# Patient Record
Sex: FEMALE | Race: WHITE | NOT HISPANIC OR LATINO | URBAN - METROPOLITAN AREA
[De-identification: names, ages, dates, MRNs, and addresses within clinical notes are randomized per-mention and may not be internally consistent; named-entity substitution may affect disease eponyms.]

---

## 2023-06-07 DIAGNOSIS — I10 HYPERTENSION, UNSPECIFIED TYPE: ICD-10-CM

## 2023-06-08 RX ORDER — METOPROLOL TARTRATE 50 MG/1
TABLET ORAL
Qty: 90 TABLET | Refills: 3 | Status: SHIPPED | OUTPATIENT
Start: 2023-06-08 | End: 2023-06-12

## 2023-06-09 DIAGNOSIS — I10 HYPERTENSION, UNSPECIFIED TYPE: ICD-10-CM

## 2023-06-12 LAB
NON-UH HIE A/G RATIO: 1.2
NON-UH HIE ALB: 3.7 G/DL (ref 3.4–5)
NON-UH HIE ALK PHOS: 48 UNIT/L (ref 46–116)
NON-UH HIE APPEARANCE, U: CLEAR
NON-UH HIE BASO COUNT: 0.06 X1000 (ref 0–0.2)
NON-UH HIE BASOS %: 0.9 %
NON-UH HIE BILIRUBIN, TOTAL: 0.6 MG/DL (ref 0.2–1)
NON-UH HIE BILIRUBIN, U: NEGATIVE
NON-UH HIE BLOOD, U: NEGATIVE
NON-UH HIE BUN/CREAT RATIO: 18.8
NON-UH HIE BUN: 15 MG/DL (ref 9–23)
NON-UH HIE CALCIUM: 9.3 MG/DL (ref 8.7–10.4)
NON-UH HIE CALCULATED LDL CHOLESTEROL: 73 MG/DL (ref 60–130)
NON-UH HIE CALCULATED OSMOLALITY: 284 MOSM/KG (ref 275–295)
NON-UH HIE CHLORIDE: 105 MMOL/L (ref 98–107)
NON-UH HIE CHOLESTEROL: 134 MG/DL (ref 100–200)
NON-UH HIE CO2, VENOUS: 30 MMOL/L (ref 20–31)
NON-UH HIE COLOR, U: NORMAL
NON-UH HIE CREATININE, URINE MG/DL: 45.7 MG/DL
NON-UH HIE CREATININE: 0.8 MG/DL (ref 0.5–0.8)
NON-UH HIE DIFF?: NO
NON-UH HIE EOS COUNT: 0.07 X1000 (ref 0–0.5)
NON-UH HIE EOSIN %: 1 %
NON-UH HIE GFR AA: >60
NON-UH HIE GLOBULIN: 3.1 G/DL
NON-UH HIE GLOMERULAR FILTRATION RATE: >60 ML/MIN/1.73M?
NON-UH HIE GLUCOSE QUAL, U: NEGATIVE
NON-UH HIE GLUCOSE: 60 MG/DL (ref 74–106)
NON-UH HIE GOT: 12 UNIT/L (ref 15–37)
NON-UH HIE GPT: 14 UNIT/L (ref 10–49)
NON-UH HIE HCT: 41.2 % (ref 36–46)
NON-UH HIE HDL CHOLESTEROL: 41 MG/DL (ref 40–60)
NON-UH HIE HGB A1C: 6.8 %
NON-UH HIE HGB: 13.7 G/DL (ref 12–16)
NON-UH HIE INSTR WBC: 6.7
NON-UH HIE IRON: 37 UG/DL (ref 40–170)
NON-UH HIE K: 4.2 MMOL/L (ref 3.5–5.1)
NON-UH HIE KETONES, U: NEGATIVE
NON-UH HIE LEUKOCYTE ESTERASE, U: NEGATIVE
NON-UH HIE LYMPH %: 27.5 %
NON-UH HIE LYMPH COUNT: 1.84 X1000 (ref 1.2–4.8)
NON-UH HIE MCH: 27.5 PG (ref 27–34)
NON-UH HIE MCHC: 33.1 G/DL (ref 32–37)
NON-UH HIE MCV: 83.1 FL (ref 80–100)
NON-UH HIE MICROALBUMIN, URINE MG/L: <5 MG/L
NON-UH HIE MICROALBUMIN/CREATININE RATIO: 7 MG MALB/GM CREAT (ref 0–30)
NON-UH HIE MONO %: 6.7 %
NON-UH HIE MONO COUNT: 0.45 X1000 (ref 0.1–1)
NON-UH HIE MPV: 7.9 FL (ref 7.4–10.4)
NON-UH HIE NA: 143 MMOL/L (ref 135–145)
NON-UH HIE NEUTROPHIL %: 63.9 %
NON-UH HIE NEUTROPHIL COUNT (ANC): 4.29 X1000 (ref 1.4–8.8)
NON-UH HIE NITRITE, U: NEGATIVE
NON-UH HIE NUCLEATED RBC: 0 /100WBC
NON-UH HIE PH, U: 5 (ref 4.5–8)
NON-UH HIE PLATELET: 295 X10 (ref 150–450)
NON-UH HIE PROTEIN, U: NEGATIVE
NON-UH HIE RBC: 4.96 X10 (ref 4.2–5.4)
NON-UH HIE RDW: 13.8 % (ref 11.5–14.5)
NON-UH HIE SPECIFIC GRAVITY, U: 1.01 (ref 1–1.03)
NON-UH HIE TOTAL CHOL/HDL CHOL RATIO: 3.3
NON-UH HIE TOTAL PROTEIN: 6.8 G/DL (ref 5.7–8.2)
NON-UH HIE TRIGLYCERIDES: 99 MG/DL (ref 30–150)
NON-UH HIE TSH: 1.58 UIU/ML (ref 0.55–4.78)
NON-UH HIE U MICRO: NORMAL
NON-UH HIE UROBILINOGEN QUAL, U: NORMAL
NON-UH HIE VIT D 25: 35 NG/ML
NON-UH HIE VITAMIN B12: 263 PG/ML (ref 211–911)
NON-UH HIE WBC: 6.7 X10 (ref 4.5–11)

## 2023-06-12 RX ORDER — METOPROLOL TARTRATE 50 MG/1
TABLET ORAL
Qty: 90 TABLET | Refills: 3 | Status: SHIPPED | OUTPATIENT
Start: 2023-06-12 | End: 2023-06-15 | Stop reason: SDUPTHER

## 2023-06-12 NOTE — TELEPHONE ENCOUNTER
----- Message from Tosha Lamb MD sent at 6/12/2023  1:55 PM EDT -----  Hypoglycemia low iron level advised complex carbohydrate diet plus exercise Slow Fe 1 a day follow-up 3 months

## 2023-06-15 ENCOUNTER — TELEPHONE (OUTPATIENT)
Dept: PRIMARY CARE | Facility: CLINIC | Age: 68
End: 2023-06-15

## 2023-06-15 ENCOUNTER — OFFICE VISIT (OUTPATIENT)
Dept: PRIMARY CARE | Facility: CLINIC | Age: 68
End: 2023-06-15
Payer: MEDICARE

## 2023-06-15 VITALS
HEART RATE: 61 BPM | TEMPERATURE: 97.6 F | SYSTOLIC BLOOD PRESSURE: 147 MMHG | BODY MASS INDEX: 31.64 KG/M2 | OXYGEN SATURATION: 97 % | WEIGHT: 221 LBS | HEIGHT: 70 IN | DIASTOLIC BLOOD PRESSURE: 87 MMHG

## 2023-06-15 DIAGNOSIS — I10 HYPERTENSION, UNSPECIFIED TYPE: ICD-10-CM

## 2023-06-15 DIAGNOSIS — E78.5 HYPERLIPIDEMIA, UNSPECIFIED HYPERLIPIDEMIA TYPE: ICD-10-CM

## 2023-06-15 DIAGNOSIS — I73.9 PERIPHERAL ARTERIAL DISEASE (CMS-HCC): ICD-10-CM

## 2023-06-15 DIAGNOSIS — I15.2 HYPERTENSION ASSOCIATED WITH DIABETES (MULTI): Primary | ICD-10-CM

## 2023-06-15 DIAGNOSIS — E11.59 HYPERTENSION ASSOCIATED WITH DIABETES (MULTI): Primary | ICD-10-CM

## 2023-06-15 DIAGNOSIS — Z12.12 SCREENING FOR RECTAL CANCER: ICD-10-CM

## 2023-06-15 DIAGNOSIS — I63.9 CEREBROVASCULAR ACCIDENT (CVA), UNSPECIFIED MECHANISM (MULTI): ICD-10-CM

## 2023-06-15 DIAGNOSIS — Z12.11 SCREENING FOR COLON CANCER: ICD-10-CM

## 2023-06-15 DIAGNOSIS — N18.30 STAGE 3 CHRONIC KIDNEY DISEASE, UNSPECIFIED WHETHER STAGE 3A OR 3B CKD (MULTI): ICD-10-CM

## 2023-06-15 DIAGNOSIS — I48.20 CHRONIC ATRIAL FIBRILLATION (MULTI): ICD-10-CM

## 2023-06-15 DIAGNOSIS — Z12.11 COLON CANCER SCREENING: ICD-10-CM

## 2023-06-15 PROCEDURE — 1159F MED LIST DOCD IN RCRD: CPT | Performed by: INTERNAL MEDICINE

## 2023-06-15 PROCEDURE — 3079F DIAST BP 80-89 MM HG: CPT | Performed by: INTERNAL MEDICINE

## 2023-06-15 PROCEDURE — 3077F SYST BP >= 140 MM HG: CPT | Performed by: INTERNAL MEDICINE

## 2023-06-15 PROCEDURE — 1036F TOBACCO NON-USER: CPT | Performed by: INTERNAL MEDICINE

## 2023-06-15 PROCEDURE — 99214 OFFICE O/P EST MOD 30 MIN: CPT | Performed by: INTERNAL MEDICINE

## 2023-06-15 PROCEDURE — 4010F ACE/ARB THERAPY RXD/TAKEN: CPT | Performed by: INTERNAL MEDICINE

## 2023-06-15 PROCEDURE — 1160F RVW MEDS BY RX/DR IN RCRD: CPT | Performed by: INTERNAL MEDICINE

## 2023-06-15 RX ORDER — PEN NEEDLE, DIABETIC 31 GX5/16"
NEEDLE, DISPOSABLE MISCELLANEOUS
COMMUNITY
Start: 2022-07-20 | End: 2024-02-26 | Stop reason: SDUPTHER

## 2023-06-15 RX ORDER — APIXABAN 5 MG/1
1 TABLET, FILM COATED ORAL 2 TIMES DAILY
COMMUNITY
Start: 2021-08-03 | End: 2023-06-15 | Stop reason: SDUPTHER

## 2023-06-15 RX ORDER — AMLODIPINE BESYLATE 5 MG/1
5 TABLET ORAL DAILY
Qty: 90 TABLET | Refills: 3 | Status: SHIPPED | OUTPATIENT
Start: 2023-06-15 | End: 2023-06-19

## 2023-06-15 RX ORDER — FUROSEMIDE 20 MG/1
TABLET ORAL
COMMUNITY
Start: 2021-03-15 | End: 2023-06-15 | Stop reason: SDUPTHER

## 2023-06-15 RX ORDER — METFORMIN HYDROCHLORIDE 500 MG/1
TABLET ORAL
COMMUNITY
Start: 2018-02-22 | End: 2023-06-19

## 2023-06-15 RX ORDER — ROSUVASTATIN CALCIUM 10 MG/1
10 TABLET, COATED ORAL DAILY
Qty: 90 TABLET | Refills: 3 | Status: SHIPPED | OUTPATIENT
Start: 2023-06-15 | End: 2023-06-19

## 2023-06-15 RX ORDER — ROSUVASTATIN CALCIUM 10 MG/1
TABLET, COATED ORAL
COMMUNITY
Start: 2014-04-28 | End: 2023-06-15 | Stop reason: SDUPTHER

## 2023-06-15 RX ORDER — FUROSEMIDE 20 MG/1
20 TABLET ORAL DAILY
Qty: 90 TABLET | Refills: 3 | Status: SHIPPED | OUTPATIENT
Start: 2023-06-15 | End: 2023-06-19

## 2023-06-15 RX ORDER — FLECAINIDE ACETATE 100 MG/1
1 TABLET ORAL EVERY 12 HOURS
COMMUNITY
Start: 2021-03-25 | End: 2023-06-15 | Stop reason: SDUPTHER

## 2023-06-15 RX ORDER — POTASSIUM CHLORIDE 20 MEQ/1
20 TABLET, EXTENDED RELEASE ORAL DAILY
Qty: 90 TABLET | Refills: 3 | Status: SHIPPED | OUTPATIENT
Start: 2023-06-15 | End: 2023-06-19

## 2023-06-15 RX ORDER — METOPROLOL TARTRATE 50 MG/1
50 TABLET ORAL DAILY
Qty: 90 TABLET | Refills: 3 | Status: SHIPPED | OUTPATIENT
Start: 2023-06-15 | End: 2024-05-09

## 2023-06-15 RX ORDER — CALCIUM CARBONATE/VITAMIN D3 600MG-5MCG
TABLET ORAL
COMMUNITY
Start: 2014-09-22

## 2023-06-15 RX ORDER — FLECAINIDE ACETATE 100 MG/1
100 TABLET ORAL EVERY 12 HOURS
Qty: 180 TABLET | Refills: 3 | Status: SHIPPED | OUTPATIENT
Start: 2023-06-15 | End: 2023-06-19

## 2023-06-15 RX ORDER — LISINOPRIL 20 MG/1
TABLET ORAL
COMMUNITY
Start: 2021-03-15 | End: 2023-10-12 | Stop reason: SDUPTHER

## 2023-06-15 RX ORDER — AMLODIPINE BESYLATE 5 MG/1
TABLET ORAL
COMMUNITY
Start: 2013-10-22 | End: 2023-06-15 | Stop reason: SDUPTHER

## 2023-06-15 RX ORDER — ASCORBIC ACID 500 MG
TABLET ORAL
COMMUNITY
Start: 2022-10-20 | End: 2023-10-12

## 2023-06-15 RX ORDER — INSULIN DEGLUDEC 200 U/ML
INJECTION, SOLUTION SUBCUTANEOUS
COMMUNITY
Start: 2016-12-15 | End: 2023-10-12 | Stop reason: SDUPTHER

## 2023-06-15 RX ORDER — POTASSIUM CHLORIDE 1500 MG/1
TABLET, EXTENDED RELEASE ORAL
COMMUNITY
Start: 2021-03-11 | End: 2023-06-15 | Stop reason: SDUPTHER

## 2023-06-15 NOTE — PROGRESS NOTES
"Subjective   Patient ID: Viki Boykin is a 67 y.o. female who presents for Follow-up (Go over blood work ).    Assessment/Plan     Problem List Items Addressed This Visit          Circulatory    Hypertension associated with diabetes (CMS/HCC) - Primary    Peripheral arterial disease (CMS/HCC)    Chronic atrial fibrillation (CMS/HCC)    Relevant Medications    amLODIPine (Norvasc) 5 mg tablet       Genitourinary    Stage 3 chronic kidney disease, unspecified whether stage 3a or 3b CKD (CMS/HCC)     Other Visit Diagnoses       Cerebrovascular accident (CVA), unspecified mechanism (CMS/HCC)        Relevant Orders    Disability Placard        Patient was evaluated today, problem list was reviewed, problems and concerns addressed, Rx list reviewed and updated, lab and tests were noted and reviewed. Life style changes were discussed, always it works better if we eat plant based diet and plenty of fibres and roughage. Consume adequate amount of water and avoid alcohol, light to moderate physical activities and stress reduction are always beneficial for ongoing physical well being. Do not forget to have 6 to 7 hours of sleep regularly and avoid late night artur screen exposure.      HPI 67-year-old patient have a stroke with a left hemiplegia chronic hypertension hyperlipidemia anemia obesity diabetes with complication atrial fibrillation complaining arthralgia myalgia fatigue tired weakness    Negative for headache chest pain fall    Negative for hypoxia hypoglycemia    Negative for suicide        Allergies   Allergen Reactions    Cat Dander Swelling    Penicillins Other     PCN, did not work on patient.    Tree And Shrub Pollen Hives       Current Outpatient Medications   Medication Sig Dispense Refill    amLODIPine (Norvasc) 5 mg tablet Take by mouth.      ascorbic acid (Vitamin C) 500 mg tablet Take by mouth.      BD Ultra-Fine Dulce Pen Needle 32 gauge x 5/32\" needle INJECTS ONCE A DAY      calcium carbonate-vitamin " "D3 600 mg-5 mcg (200 unit) tablet Take by mouth once daily.      Eliquis 5 mg tablet Take 1 tablet (5 mg) by mouth 2 times a day.      flecainide (Tambocor) 100 mg tablet Take 1 tablet (100 mg) by mouth every 12 hours.      furosemide (Lasix) 20 mg tablet Take by mouth.      insulin degludec (Tresiba FlexTouch) 200 unit/mL (3 mL) injection Inject under the skin.      Klor-Con M20 20 mEq ER tablet Take by mouth.      lisinopril 20 mg tablet Take by mouth once daily.      metFORMIN (Glucophage) 500 mg tablet Take by mouth.      metoprolol tartrate (Lopressor) 50 mg tablet TAKE 1 TABLET BY MOUTH DAILY 90 tablet 3    rosuvastatin (Crestor) 10 mg tablet Take by mouth.      zinc acetate 50 mg (zinc) capsule 50 mg.       No current facility-administered medications for this visit.       Objective   Visit Vitals  /87 (BP Location: Left arm, Patient Position: Sitting, BP Cuff Size: Large adult)   Pulse 61   Temp 36.4 °C (97.6 °F)   Ht 1.778 m (5' 10\")   Wt 100 kg (221 lb)   SpO2 97%   BMI 31.71 kg/m²   Smoking Status Never   BSA 2.22 m²     Physical Exam  Cardiovascular:      Pulses:           Dorsalis pedis pulses are 2+ on the right side and 2+ on the left side.        Posterior tibial pulses are 2+ on the right side and 2+ on the left side.   Musculoskeletal:      Right foot: No deformity.      Left foot: No deformity.   Feet:      Right foot:      Protective Sensation: 5 sites tested.        Skin integrity: Skin integrity normal.      Toenail Condition: Right toenails are normal.      Left foot:      Protective Sensation: 5 sites tested.        Skin integrity: Skin integrity normal.      Toenail Condition: Left toenails are normal.       Constitutional:       General: He is not in acute distress.     Appearance: Normal appearance.  Obesity  HENT:      Head: Normocephalic and atraumatic.      Nose: Nose normal.   Eyes:      Extraocular Movements: Extraocular movements intact.      Conjunctiva/sclera: Conjunctivae " normal.   Cardiovascular:      Rate and Rhythm: Normal rate and regular rhythm.  Irregular  Pulmonary:      Effort: Pulmonary effort is normal.      Breath sounds: Normal breath sounds.   Skin:     General: Skin is warm.   Neurological: Diabetic neuropathy     Mental Status: He is alert and oriented to person, place, and time.  Left hemiplegia  Psychiatric:         Mood and Affect: Mood normal.         Behavior: Behavior normal.   Immunization History   Administered Date(s) Administered    Influenza, Unspecified 11/16/2009, 10/12/2010, 11/12/2012, 08/29/2013, 09/22/2014, 12/01/2015, 09/12/2016, 09/07/2018    Influenza, seasonal, injectable 10/13/2020    Pfizer Purple Cap SARS-CoV-2 03/30/2021, 04/20/2021    Pneumococcal Conjugate Pcv 20 02/13/2023    Pneumococcal Polysaccharide PPSV23 06/19/2020    SARS-CoV-2, Unspecified 04/30/2022    TD (adult), 2 Lf tetanus toxoid, preservative free, adsorbed 11/20/2007    Tdap 05/23/2021    Tetanus toxoid, adsorbed 11/20/2007       Review of Systems    Orders Only on 06/12/2023   Component Date Value Ref Range Status    [facility] RDW 06/12/2023 13.8  11.5 - 14.5 % Final    [facility] WBC 06/12/2023 6.7  4.5 - 11.0 x10 Final    [facility] MCH 06/12/2023 27.5  27.0 - 34.0 pg Final    [facility] Nucleated RBC 06/12/2023 0  /100WBC Final    [facility] HCT 06/12/2023 41.2  36.0 - 46.0 % Final    [facility] Platelet 06/12/2023 295  150 - 450 x10 Final    [facility] RBC 06/12/2023 4.96  4.20 - 5.40 x10 Final    [facility] Instr WBC 06/12/2023 6.7   Final    [facility] MCHC 06/12/2023 33.1  32.0 - 37.0 g/dL Final    [facility] MCV 06/12/2023 83.1  80.0 - 100.0 fL Final    [facility] HGB 06/12/2023 13.7  12.0 - 16.0 g/dL Final    [facility] MPV 06/12/2023 7.9  7.4 - 10.4 fL Final    [facility] DIFF? 06/12/2023 No   Final    [facility] Mono Count 06/12/2023 0.45  0.10 - 1.00 x1000 Final    [facility] Neutrophil Count (ANC) 06/12/2023 4.29  1.40 - 8.80 x1000 Final    [facility] Neutrophil  % 06/12/2023 63.9  % Final    [facility] Eos Count 06/12/2023 0.07  0.00 - 0.50 x1000 Final    [facility] Eosin % 06/12/2023 1.0  % Final    [facility] Lymph % 06/12/2023 27.5  % Final    [facility] Lymph Count 06/12/2023 1.84  1.20 - 4.80 x1000 Final    [facility] Basos % 06/12/2023 0.9  % Final    [facility] Baso Count 06/12/2023 0.06  0.00 - 0.20 x1000 Final    [facility] Mono % 06/12/2023 6.7  % Final    [facility] U MICRO 06/12/2023 Not Indicated   Final    [facility] Nitrite, U 06/12/2023 Negative  Negative Final    [facility] Ketones, U 06/12/2023 Negative  Negative Final    [facility] pH, U 06/12/2023 5.0  4.5 - 8.0 Final    [facility] Glucose Qual, U 06/12/2023 Negative  Negative Final    [facility] Protein, U 06/12/2023 Negative  Negative Final    [facility] Color, U 06/12/2023 Straw   Final    [facility] Leukocyte Esterase, U 06/12/2023 Negative  Negative Final    [facility] Specific Gravity, U 06/12/2023 1.008  1.001 - 1.035 Final    [facility] Urobilinogen Qual, U 06/12/2023 <2.0 mg/dl  <2.0 mg/dl Final    [facility] Bilirubin, U 06/12/2023 Negative  Negative Final    [facility] Blood, U 06/12/2023 Negative  Negative Final    [facility] Appearance, U 06/12/2023 Clear   Final    [facility] IRON 06/12/2023 37 (L)  40 - 170 ug/dl Final    [facility] HGB A1C 06/12/2023 6.8  % Final    [facility] GPT 06/12/2023 14  10 - 49 unit/L Final    [facility] Alk Phos 06/12/2023 48  46 - 116 unit/L Final    [facility] Creatinine 06/12/2023 0.8  0.5 - 0.8 mg/dL Final    [facility] Total Protein 06/12/2023 6.8  5.7 - 8.2 g/dL Final    [facility] CO2, venous 06/12/2023 30.0  20.0 - 31.0 mmol/L Final    [facility] Glomerular Filtration R* 06/12/2023 >60  mL/min/1.73m? Final    [facility] Calculated Osmolality 06/12/2023 284  275 - 295 mOsm/kg Final    [facility] K 06/12/2023 4.2  3.5 - 5.1 mmol/L Final    [facility] Globulin 06/12/2023 3.1  g/dL Final    [facility] BUN 06/12/2023 15  9 - 23 mg/dL Final    [facility]  Calcium 06/12/2023 9.3  8.7 - 10.4 mg/dL Final    [facility] GOT 06/12/2023 12 (L)  15 - 37 unit/L Final    [facility] Glucose 06/12/2023 60 (L)  74 - 106 mg/dL Final    [facility] ALB 06/12/2023 3.7  3.4 - 5.0 g/dL Final    [facility] GFR AA 06/12/2023 >60   Final    [facility] Bilirubin, Total 06/12/2023 0.60  0.20 - 1.00 mg/dL Final    [facility] Chloride 06/12/2023 105  98 - 107 mmol/L Final    [facility] A/G Ratio 06/12/2023 1.2   Final    [facility] Na 06/12/2023 143  135 - 145 mmol/L Final    [facility] BUN/Creat Ratio 06/12/2023 18.8   Final    [facility] TSH 06/12/2023 1.58  0.55 - 4.78 uIU/ml Final    [facility] Microalbumin/Creatinine* 06/12/2023 7  0 - 30 mg MALB/gm CREAT Final    [facility] Creatinine, Urine mg/dl 06/12/2023 45.7  mg/dL Final    [facility] Microalbumin, Urine mg/L 06/12/2023 <5.0  mg/L Final    [facility] Cholesterol 06/12/2023 134  100 - 200 mg/dL Final    [facility] Calculated LDL Choleste* 06/12/2023 73  60 - 130 mg/dL Final    [facility] HDL Cholesterol 06/12/2023 41  40 - 60 mg/dL Final    [facility] Total Chol/HDL Chol Rat* 06/12/2023 3.3   Final    [facility] Triglycerides 06/12/2023 99  30 - 150 mg/dL Final    [facility] Vitamin B12 06/12/2023 263  211 - 911 pg/mL Final    [facility] Vit D 25 06/12/2023 35  ng/mL Final       Radiology: Reviewed imaging in powerchart.  No results found.    Family History   Problem Relation Name Age of Onset    Hypertension Mother      Diabetes Mother      Other (fibroid tumors) Mother      Diabetes Father      Heart disease Father       Social History     Socioeconomic History    Marital status:      Spouse name: None    Number of children: None    Years of education: None    Highest education level: None   Occupational History    None   Tobacco Use    Smoking status: Never    Smokeless tobacco: Never   Substance and Sexual Activity    Alcohol use: Yes     Comment: rare    Drug use: Never    Sexual activity: None   Other Topics Concern     None   Social History Narrative    None     Social Determinants of Health     Financial Resource Strain: Not on file   Food Insecurity: Not on file   Transportation Needs: Not on file   Physical Activity: Not on file   Stress: Not on file   Social Connections: Not on file   Intimate Partner Violence: Not on file   Housing Stability: Not on file     Past Medical History:   Diagnosis Date    Abscess of vulva 06/22/2020    Boil, labium    Body mass index (BMI) 31.0-31.9, adult 06/30/2022    BMI 31.0-31.9,adult    Candidiasis of skin and nail 12/05/2016    Skin yeast infection    Candidiasis of skin and nail     Cutaneous candidiasis    Candidiasis, unspecified 06/19/2020    Yeast infection    Contact with and (suspected) exposure to covid-19 02/08/2021    Suspected COVID-19 virus infection    Encounter for screening for malignant neoplasm of colon 06/27/2022    Screen for colon cancer    Encounter for screening for malignant neoplasm of rectum 06/27/2022    Screening for rectal cancer    Essential (primary) hypertension 07/17/2020    Benign essential hypertension    Fracture of orbit, unspecified, initial encounter for closed fracture (CMS/[facility]) 05/25/2021    Right orbital fracture    History of falling 12/02/2021    History of fall    Influenza due to unidentified influenza virus with other respiratory manifestations 03/05/2018    Bronchitis with flu    Osteoarthritis of knee, unspecified     Knee osteoarthritis    Overweight 06/30/2022    Overweight with body mass index (BMI) of 28 to 28.9 in adult    Pain in left shoulder 06/19/2020    Left shoulder pain    Personal history of other diseases of the circulatory system 12/02/2021    History of peripheral arterial disease    Personal history of other diseases of the digestive system 05/15/2019    History of constipation    Personal history of other diseases of the female genital tract 10/19/2017    History of vaginitis    Personal history of other diseases of the  musculoskeletal system and connective tissue     Personal history of osteoporosis    Personal history of other diseases of the musculoskeletal system and connective tissue 2019    History of muscle spasm    Personal history of other endocrine, nutritional and metabolic disease 2022    History of vitamin D deficiency    Personal history of other endocrine, nutritional and metabolic disease 2021    History of hypothyroidism    Personal history of other endocrine, nutritional and metabolic disease 2020    History of morbid obesity    Personal history of other endocrine, nutritional and metabolic disease 2020    History of hyperlipidemia    Personal history of other endocrine, nutritional and metabolic disease 2020    History of hypokalemia    Personal history of other infectious and parasitic diseases     History of mumps    Personal history of other infectious and parasitic diseases     History of measles    Personal history of other infectious and parasitic diseases     History of varicella    Personal history of pneumonia (recurrent)     History of pneumonia    Personal history of traumatic brain injury 2021    History of closed head injury    Personal history of urinary (tract) infections 2021    History of urinary tract infection    Type 2 diabetes mellitus with unspecified complications (CMS/[facility]) 2017    Type 2 diabetes mellitus with complication, unspecified long term insulin use status    Unspecified dislocation of left little finger, initial encounter     Dislocation of fifth finger, left, closed     Past Surgical History:   Procedure Laterality Date     SECTION, CLASSIC  2014     Section    SHOULDER SURGERY  2014    Shoulder Surgery    TONSILLECTOMY  2014    Tonsillectomy With Adenoidectomy       Charting was completed using voice recognition technology and may include unintended errors.

## 2023-06-18 DIAGNOSIS — E11.59 HYPERTENSION ASSOCIATED WITH DIABETES (MULTI): ICD-10-CM

## 2023-06-18 DIAGNOSIS — Z79.4 TYPE 2 DIABETES MELLITUS WITHOUT COMPLICATION, WITH LONG-TERM CURRENT USE OF INSULIN (MULTI): ICD-10-CM

## 2023-06-18 DIAGNOSIS — E11.9 TYPE 2 DIABETES MELLITUS WITHOUT COMPLICATION, WITH LONG-TERM CURRENT USE OF INSULIN (MULTI): ICD-10-CM

## 2023-06-18 DIAGNOSIS — I15.2 HYPERTENSION ASSOCIATED WITH DIABETES (MULTI): ICD-10-CM

## 2023-06-18 DIAGNOSIS — E78.5 HYPERLIPIDEMIA, UNSPECIFIED HYPERLIPIDEMIA TYPE: ICD-10-CM

## 2023-06-18 DIAGNOSIS — I48.20 CHRONIC ATRIAL FIBRILLATION (MULTI): ICD-10-CM

## 2023-06-18 DIAGNOSIS — I10 HYPERTENSION, UNSPECIFIED TYPE: ICD-10-CM

## 2023-06-19 RX ORDER — FUROSEMIDE 20 MG/1
TABLET ORAL
Qty: 90 TABLET | Refills: 3 | Status: SHIPPED | OUTPATIENT
Start: 2023-06-19 | End: 2024-05-09

## 2023-06-19 RX ORDER — FLECAINIDE ACETATE 100 MG/1
TABLET ORAL
Qty: 180 TABLET | Refills: 3 | Status: SHIPPED | OUTPATIENT
Start: 2023-06-19 | End: 2024-05-09

## 2023-06-19 RX ORDER — AMLODIPINE BESYLATE 5 MG/1
TABLET ORAL
Qty: 180 TABLET | Refills: 3 | Status: SHIPPED | OUTPATIENT
Start: 2023-06-19 | End: 2024-05-09

## 2023-06-19 RX ORDER — METFORMIN HYDROCHLORIDE 500 MG/1
TABLET ORAL
Qty: 270 TABLET | Refills: 3 | Status: SHIPPED | OUTPATIENT
Start: 2023-06-19 | End: 2024-05-09

## 2023-06-19 RX ORDER — POTASSIUM CHLORIDE 20 MEQ/1
TABLET, EXTENDED RELEASE ORAL
Qty: 90 TABLET | Refills: 3 | Status: SHIPPED | OUTPATIENT
Start: 2023-06-19 | End: 2024-05-09

## 2023-06-19 RX ORDER — ROSUVASTATIN CALCIUM 10 MG/1
TABLET, COATED ORAL
Qty: 90 TABLET | Refills: 3 | Status: SHIPPED | OUTPATIENT
Start: 2023-06-19 | End: 2024-05-09

## 2023-06-19 RX ORDER — APIXABAN 5 MG/1
TABLET, FILM COATED ORAL
Qty: 180 TABLET | Refills: 3 | Status: SHIPPED | OUTPATIENT
Start: 2023-06-19 | End: 2024-05-09

## 2023-06-30 LAB — NONINV COLON CA DNA+OCC BLD SCRN STL QL: NEGATIVE

## 2023-09-21 ENCOUNTER — TELEPHONE (OUTPATIENT)
Dept: PRIMARY CARE | Facility: CLINIC | Age: 68
End: 2023-09-21
Payer: MEDICARE

## 2023-09-21 DIAGNOSIS — M81.0 OSTEOPOROSIS, UNSPECIFIED OSTEOPOROSIS TYPE, UNSPECIFIED PATHOLOGICAL FRACTURE PRESENCE: ICD-10-CM

## 2023-09-21 RX ORDER — ALENDRONATE SODIUM 70 MG/1
70 TABLET ORAL
Qty: 12 TABLET | Refills: 3 | Status: SHIPPED | OUTPATIENT
Start: 2023-09-21 | End: 2023-10-12 | Stop reason: SINTOL

## 2023-09-22 ENCOUNTER — TELEPHONE (OUTPATIENT)
Dept: PRIMARY CARE | Facility: CLINIC | Age: 68
End: 2023-09-22
Payer: MEDICARE

## 2023-09-28 ENCOUNTER — APPOINTMENT (OUTPATIENT)
Dept: PRIMARY CARE | Facility: CLINIC | Age: 68
End: 2023-09-28
Payer: MEDICARE

## 2023-10-12 ENCOUNTER — OFFICE VISIT (OUTPATIENT)
Dept: PRIMARY CARE | Facility: CLINIC | Age: 68
End: 2023-10-12
Payer: MEDICARE

## 2023-10-12 VITALS
WEIGHT: 222.6 LBS | DIASTOLIC BLOOD PRESSURE: 71 MMHG | TEMPERATURE: 97.4 F | OXYGEN SATURATION: 98 % | HEART RATE: 66 BPM | SYSTOLIC BLOOD PRESSURE: 140 MMHG | HEIGHT: 70 IN | BODY MASS INDEX: 31.87 KG/M2

## 2023-10-12 DIAGNOSIS — E11.59 HYPERTENSION ASSOCIATED WITH DIABETES (MULTI): ICD-10-CM

## 2023-10-12 DIAGNOSIS — I63.00 CEREBROVASCULAR ACCIDENT (CVA) DUE TO THROMBOSIS OF PRECEREBRAL ARTERY (MULTI): ICD-10-CM

## 2023-10-12 DIAGNOSIS — M81.0 AGE-RELATED OSTEOPOROSIS WITHOUT CURRENT PATHOLOGICAL FRACTURE: Primary | ICD-10-CM

## 2023-10-12 DIAGNOSIS — I48.20 CHRONIC ATRIAL FIBRILLATION (MULTI): ICD-10-CM

## 2023-10-12 DIAGNOSIS — Z79.4 TYPE 2 DIABETES MELLITUS WITHOUT COMPLICATION, WITH LONG-TERM CURRENT USE OF INSULIN (MULTI): ICD-10-CM

## 2023-10-12 DIAGNOSIS — I10 HYPERTENSION, UNSPECIFIED TYPE: ICD-10-CM

## 2023-10-12 DIAGNOSIS — E11.9 TYPE 2 DIABETES MELLITUS WITHOUT COMPLICATION, WITH LONG-TERM CURRENT USE OF INSULIN (MULTI): ICD-10-CM

## 2023-10-12 DIAGNOSIS — Z23 NEED FOR INFLUENZA VACCINATION: ICD-10-CM

## 2023-10-12 DIAGNOSIS — I15.2 HYPERTENSION ASSOCIATED WITH DIABETES (MULTI): ICD-10-CM

## 2023-10-12 PROBLEM — Z12.11 COLON CANCER SCREENING: Status: RESOLVED | Noted: 2023-06-15 | Resolved: 2023-10-12

## 2023-10-12 PROBLEM — N18.30 STAGE 3 CHRONIC KIDNEY DISEASE, UNSPECIFIED WHETHER STAGE 3A OR 3B CKD (MULTI): Status: RESOLVED | Noted: 2023-06-15 | Resolved: 2023-10-12

## 2023-10-12 PROCEDURE — 1160F RVW MEDS BY RX/DR IN RCRD: CPT | Performed by: INTERNAL MEDICINE

## 2023-10-12 PROCEDURE — 99214 OFFICE O/P EST MOD 30 MIN: CPT | Performed by: INTERNAL MEDICINE

## 2023-10-12 PROCEDURE — 4010F ACE/ARB THERAPY RXD/TAKEN: CPT | Performed by: INTERNAL MEDICINE

## 2023-10-12 PROCEDURE — 1036F TOBACCO NON-USER: CPT | Performed by: INTERNAL MEDICINE

## 2023-10-12 PROCEDURE — 90662 IIV NO PRSV INCREASED AG IM: CPT | Performed by: INTERNAL MEDICINE

## 2023-10-12 PROCEDURE — 3078F DIAST BP <80 MM HG: CPT | Performed by: INTERNAL MEDICINE

## 2023-10-12 PROCEDURE — 3077F SYST BP >= 140 MM HG: CPT | Performed by: INTERNAL MEDICINE

## 2023-10-12 PROCEDURE — G0008 ADMIN INFLUENZA VIRUS VAC: HCPCS | Performed by: INTERNAL MEDICINE

## 2023-10-12 PROCEDURE — 1159F MED LIST DOCD IN RCRD: CPT | Performed by: INTERNAL MEDICINE

## 2023-10-12 RX ORDER — INSULIN DEGLUDEC 200 U/ML
INJECTION, SOLUTION SUBCUTANEOUS
Qty: 9 ML | Refills: 3 | Status: SHIPPED | OUTPATIENT
Start: 2023-10-12 | End: 2023-12-28

## 2023-10-12 RX ORDER — LISINOPRIL 20 MG/1
20 TABLET ORAL
Qty: 90 TABLET | Refills: 3 | Status: SHIPPED | OUTPATIENT
Start: 2023-10-12

## 2023-10-12 NOTE — PROGRESS NOTES
Subjective   Patient ID: Viki Boykin is a 67 y.o. female who presents for Follow-up (Go over Dexa scan /Discuss Kidney dx).    Assessment/Plan     Problem List Items Addressed This Visit       Hypertension    Relevant Medications    lisinopril 20 mg tablet    Other Relevant Orders    Albumin , Urine Random    CBC and Auto Differential    Comprehensive Metabolic Panel    Hemoglobin A1C    Lipid Panel    Magnesium    TSH with reflex to Free T4 if abnormal    Uric Acid    Urinalysis Microscopic Only    Vitamin B12    Iron and TIBC    Ferritin    Chronic atrial fibrillation (CMS/HCC) - Primary    Cerebrovascular accident (CVA) (CMS/HCC)    Diabetes mellitus, type 2 (CMS/HCC)    Relevant Medications    insulin degludec (Tresiba FlexTouch) 200 unit/mL (3 mL) injection    Other Relevant Orders    Albumin , Urine Random    CBC and Auto Differential    Comprehensive Metabolic Panel    Hemoglobin A1C    Lipid Panel    Magnesium    TSH with reflex to Free T4 if abnormal    Uric Acid    Urinalysis Microscopic Only    Vitamin B12    Iron and TIBC    Ferritin    Need for influenza vaccination    Relevant Orders    Flu vaccine, quadrivalent, high-dose, preservative free, age 65y+ (FLUZONE)    Albumin , Urine Random    CBC and Auto Differential    Comprehensive Metabolic Panel    Hemoglobin A1C    Lipid Panel    Magnesium    TSH with reflex to Free T4 if abnormal    Uric Acid    Urinalysis Microscopic Only    Vitamin B12    Iron and TIBC    Ferritin    Age-related osteoporosis without current pathological fracture       HPI 67-year-old patient have a stroke diabetes hypertension hyperlipidemia SVT obesity diagnosis of osteoporosis given Fosamax patient does not take the pills because of the side effect and the cost of the medication    Negative for headache chest pain hematuria rectal bleeding or fall    Negative for hypoglycemia hypoxia or hypotension    Onset of the problem gradually duration few years progressed slowly  aggravating factor less exposure to sunlight because of the medication Tambocor    Advise exercise vitamin C vitamin D calcium discussed about Prolia  Past Medical History:   Diagnosis Date    Abscess of vulva 06/22/2020    Boil, labium    Body mass index (BMI) 31.0-31.9, adult 06/30/2022    BMI 31.0-31.9,adult    Candidiasis of skin and nail 12/05/2016    Skin yeast infection    Candidiasis of skin and nail     Cutaneous candidiasis    Candidiasis, unspecified 06/19/2020    Yeast infection    Contact with and (suspected) exposure to covid-19 02/08/2021    Suspected COVID-19 virus infection    Encounter for screening for malignant neoplasm of colon 06/27/2022    Screen for colon cancer    Encounter for screening for malignant neoplasm of rectum 06/27/2022    Screening for rectal cancer    Essential (primary) hypertension 07/17/2020    Benign essential hypertension    Fracture of orbit, unspecified, initial encounter for closed fracture (CMS/[facility]) 05/25/2021    Right orbital fracture    History of falling 12/02/2021    History of fall    Influenza due to unidentified influenza virus with other respiratory manifestations 03/05/2018    Bronchitis with flu    Osteoarthritis of knee, unspecified     Knee osteoarthritis    Overweight 06/30/2022    Overweight with body mass index (BMI) of 28 to 28.9 in adult    Pain in left shoulder 06/19/2020    Left shoulder pain    Personal history of other diseases of the circulatory system 12/02/2021    History of peripheral arterial disease    Personal history of other diseases of the digestive system 05/15/2019    History of constipation    Personal history of other diseases of the female genital tract 10/19/2017    History of vaginitis    Personal history of other diseases of the musculoskeletal system and connective tissue     Personal history of osteoporosis    Personal history of other diseases of the musculoskeletal system and connective tissue 11/11/2019    History of muscle  "spasm    Personal history of other endocrine, nutritional and metabolic disease 2022    History of vitamin D deficiency    Personal history of other endocrine, nutritional and metabolic disease 2021    History of hypothyroidism    Personal history of other endocrine, nutritional and metabolic disease 2020    History of morbid obesity    Personal history of other endocrine, nutritional and metabolic disease 2020    History of hyperlipidemia    Personal history of other endocrine, nutritional and metabolic disease 2020    History of hypokalemia    Personal history of other infectious and parasitic diseases     History of mumps    Personal history of other infectious and parasitic diseases     History of measles    Personal history of other infectious and parasitic diseases     History of varicella    Personal history of pneumonia (recurrent)     History of pneumonia    Personal history of traumatic brain injury 2021    History of closed head injury    Personal history of urinary (tract) infections 2021    History of urinary tract infection    Type 2 diabetes mellitus with unspecified complications (CMS/[facility]) 2017    Type 2 diabetes mellitus with complication, unspecified long term insulin use status    Unspecified dislocation of left little finger, initial encounter     Dislocation of fifth finger, left, closed     Past Surgical History:   Procedure Laterality Date     SECTION, CLASSIC  2014     Section    SHOULDER SURGERY  2014    Shoulder Surgery    TONSILLECTOMY  2014    Tonsillectomy With Adenoidectomy     Allergies   Allergen Reactions    Cat Dander Swelling    Penicillins Other     PCN, did not work on patient.    Tree [place]rub Pollen Hives     Current Outpatient Medications   Medication Sig Dispense Refill    amLODIPine (Norvasc) 5 mg tablet TAKE 1 TABLET TWICE A  tablet 3    BD Ultra-Fine Dulce Pen Needle 32 gauge x \" " needle INJECTS ONCE A DAY      calcium carbonate-vitamin D3 600 mg-5 mcg (200 unit) tablet Take by mouth once daily.      Eliquis 5 mg tablet TAKE 1 TABLET TWICE A  tablet 3    flecainide (Tambocor) 100 mg tablet TAKE 1 TABLET EVERY 12 HOURS 180 tablet 3    furosemide (Lasix) 20 mg tablet TAKE 1 TABLET DAILY 90 tablet 3    insulin degludec (Tresiba FlexTouch) 200 unit/mL (3 mL) injection Inject 58 units once a day 9 mL 3    lisinopril 20 mg tablet Take 1 tablet (20 mg) by mouth once daily. 90 tablet 3    metFORMIN (Glucophage) 500 mg tablet TAKE 1 TABLET THREE TIMES A  tablet 3    metoprolol tartrate (Lopressor) 50 mg tablet Take 1 tablet (50 mg) by mouth once daily. 90 tablet 3    potassium chloride CR (Klor-Con M20) 20 mEq ER tablet TAKE 1 TABLET DAILY 90 tablet 3    rosuvastatin (Crestor) 10 mg tablet TAKE 1 TABLET DAILY 90 tablet 3    zinc acetate 50 mg (zinc) capsule 50 mg.       No current facility-administered medications for this visit.     Family History   Problem Relation Name Age of Onset    Hypertension Mother      Diabetes Mother      Other (fibroid tumors) Mother      Diabetes Father      Heart disease Father       Social History     Socioeconomic History    Marital status:      Spouse name: None    Number of children: None    Years of education: None    Highest education level: None   Occupational History    None   Tobacco Use    Smoking status: Never    Smokeless tobacco: Never   Substance and Sexual Activity    Alcohol use: Yes     Comment: rare    Drug use: Never    Sexual activity: None   Other Topics Concern    None   Social History Narrative    None     Social Determinants of Health     Financial Resource Strain: Not on file   Food Insecurity: Not on file   Transportation Needs: Not on file   Physical Activity: Not on file   Stress: Not on file   Social Connections: Not on file   Intimate Partner Violence: Not on file   Housing Stability: Not on file     Immunization History  "  Administered Date(s) Administered    Flu vaccine (IIV4), preservative free *Check age/dose* 01/09/2018, 09/24/2021    Influenza, Unspecified 11/16/2009, 10/12/2010, 11/12/2012, 08/29/2013, 09/22/2014, 12/01/2015, 09/12/2016, 09/07/2018, 09/09/2019, 10/13/2020, 10/18/2022    Influenza, seasonal, injectable 11/16/2009, 10/12/2010, 11/12/2012, 08/29/2013, 09/22/2014, 12/01/2015, 09/12/2016, 09/07/2018, 10/13/2020    Influenza, seasonal, injectable, preservative free 12/01/2015, 09/12/2016    Novel influenza-H1N1-09, preservative-free 01/13/2010    Pfizer Gray Cap SARS-CoV-2 04/30/2022    Pfizer Purple Cap SARS-CoV-2 03/30/2021, 04/20/2021    Pneumococcal conjugate vaccine, 20-valent (PREVNAR 20) 02/13/2023    Pneumococcal polysaccharide vaccine, 23-valent, age 2 years and older (PNEUMOVAX 23) 06/19/2020    SARS-CoV-2, Unspecified 04/30/2022    Td vaccine, age 7 years and older (TDVAX) 11/20/2007    Tdap vaccine, age 7 year and older (BOOSTRIX) 05/23/2021    Tetanus toxoid, adsorbed 11/20/2007       Review of Systems  Review of systems is otherwise negative unless stated above or in history of present illness.    Objective   Visit Vitals  /71 (BP Location: Right arm, Patient Position: Sitting, BP Cuff Size: Large adult)   Pulse 66   Temp 36.3 °C (97.4 °F)   Ht 1.778 m (5' 10\")   Wt 101 kg (222 lb 9.6 oz)   SpO2 98%   BMI 31.94 kg/m²   Smoking Status Never   BSA 2.23 m²     Physical Exam  Constitutional: Obesity     General: not in acute distress.   HENT: JVD     Head: Normocephalic and atraumatic.      Nose: Nose normal.   Eyes:      Extraocular Movements: Extraocular movements intact.      Conjunctiva/sclera: Conjunctivae normal.   Cardiovascular: Irregular     Rate and Rhythm: Normal rate ,  No M/R/G  Pulmonary: Crackle     Effort: Pulmonary effort is normal.      Breath sounds: Normal, Bilat Equal AE  Skin:     General: Skin is warm.   Neurological: Hemiplegia     Mental Status: He is alert and oriented to " person, place, and time.   Psychiatric:         Mood and Affect: Mood normal.         Behavior: Behavior normal.   Musculoskeletal osteoporosis osteoarthritis  FROM in all extremitirs,  Joint-no swelling or tenderness    Office Visit on 06/15/2023   Component Date Value Ref Range Status    NONINV COLON CA DNA+OCC BLD SCRN S* 06/25/2023 Negative  Negative Final       Radiology: Reviewed imaging in powerchart.  XR DEXA bone density    Result Date: 9/18/2023  EXAM: DEXA BONE DENSITY SCREEN  9/18/2023 10:11 AM CDT HISTORY: SCREENING Osteoporosis screening. TECHNOLOGIST NOTES: WHAT SYMPTOMS ARE YOU EXPERIENCING?; SCREENING  COMPARISON: 4/11/2019 SCAN PARAMETERS: Dual energy bone densitometry lumbar spine, left forearm, and left hip. FINDINGS: Total bone density of the L1-L4 is 1.038 grams per square centimeters, and T-score being -0.1, indicating that this patient has normal bone mineral density.  BMD change: -2.8%. Total bone density of the one third left radius is 0.46 grams per square centimeters, and T-score being -3.4, indicating that this patient has osteoporosis.  Total bone mineral density of the left femoral neck is 0.552 grams per square centimeters, and T-score being -2.7, indicating that this patient has osteoporosis. BMD change: -11.5%. Total bone mineral density of the left hip is 0.789 grams per square centimeters, and T-score being -1.3, indicating that this patient has osteopenia. BMD change: -9.5%. FRAX score: 10 year risk major osteoporotic fracture 13%. 10 year risk hip fracture 2.7%. IMPRESSION: Osteoporosis. Electronically signed by:  Jules Mckenzie MD  9/20/2023 12:43 PM CDT Workstation: [id number] Technologist:  NADEEM Dictated By:   JULES MCKENZIE MD Signed By:     JULES MCKENZIE MD Signed Out:    09/20/23 13:43:14        Charting was completed using voice recognition technology and may include unintended errors.

## 2023-12-28 DIAGNOSIS — Z79.4 TYPE 2 DIABETES MELLITUS WITHOUT COMPLICATION, WITH LONG-TERM CURRENT USE OF INSULIN (MULTI): ICD-10-CM

## 2023-12-28 DIAGNOSIS — E11.9 TYPE 2 DIABETES MELLITUS WITHOUT COMPLICATION, WITH LONG-TERM CURRENT USE OF INSULIN (MULTI): ICD-10-CM

## 2023-12-28 RX ORDER — INSULIN DEGLUDEC 200 U/ML
INJECTION, SOLUTION SUBCUTANEOUS
Qty: 27 ML | Refills: 3 | Status: SHIPPED | OUTPATIENT
Start: 2023-12-28 | End: 2024-02-26 | Stop reason: SDUPTHER

## 2024-01-18 ENCOUNTER — APPOINTMENT (OUTPATIENT)
Dept: PRIMARY CARE | Facility: CLINIC | Age: 69
End: 2024-01-18
Payer: MEDICARE

## 2024-02-23 LAB
NON-UH HIE A/G RATIO: 1.2
NON-UH HIE ALB: 3.7 G/DL (ref 3.4–5)
NON-UH HIE ALK PHOS: 48 UNIT/L (ref 45–117)
NON-UH HIE APPEARANCE, U: CLEAR
NON-UH HIE BASO COUNT: 0.06 X1000 (ref 0–0.2)
NON-UH HIE BASOS %: 0.9 %
NON-UH HIE BILIRUBIN, TOTAL: 0.6 MG/DL (ref 0.3–1.2)
NON-UH HIE BILIRUBIN, U: NEGATIVE
NON-UH HIE BLOOD, U: NEGATIVE
NON-UH HIE BUN/CREAT RATIO: 26.2
NON-UH HIE BUN: 21 MG/DL (ref 9–23)
NON-UH HIE CALCIUM: 9.5 MG/DL (ref 8.7–10.4)
NON-UH HIE CALCULATED LDL CHOLESTEROL: 70 MG/DL (ref 60–130)
NON-UH HIE CALCULATED OSMOLALITY: 285 MOSM/KG (ref 275–295)
NON-UH HIE CHLORIDE: 105 MMOL/L (ref 98–107)
NON-UH HIE CHOLESTEROL: 129 MG/DL (ref 100–200)
NON-UH HIE CO2, VENOUS: 29 MMOL/L (ref 20–31)
NON-UH HIE COLOR, U: NORMAL
NON-UH HIE CREATININE, URINE MG/DL: 32.3 MG/DL
NON-UH HIE CREATININE: 0.8 MG/DL (ref 0.5–0.8)
NON-UH HIE DIFF?: NO
NON-UH HIE EOS COUNT: 0.09 X1000 (ref 0–0.5)
NON-UH HIE EOSIN %: 1.4 %
NON-UH HIE FERRITIN: 113 NG/ML (ref 10–291)
NON-UH HIE GFR AA: >60
NON-UH HIE GLOBULIN: 3 G/DL
NON-UH HIE GLOMERULAR FILTRATION RATE: >60 ML/MIN/1.73M?
NON-UH HIE GLUCOSE QUAL, U: NEGATIVE
NON-UH HIE GLUCOSE: 75 MG/DL (ref 74–106)
NON-UH HIE GOT: 13 UNIT/L (ref 15–37)
NON-UH HIE GPT: 9 UNIT/L (ref 10–49)
NON-UH HIE HCT: 40.8 % (ref 36–46)
NON-UH HIE HDL CHOLESTEROL: 39 MG/DL (ref 40–60)
NON-UH HIE HGB A1C: 7 %
NON-UH HIE HGB: 13.9 G/DL (ref 12–16)
NON-UH HIE INSTR WBC: 6.8
NON-UH HIE IRON: 49 UG/DL (ref 50–170)
NON-UH HIE K: 4 MMOL/L (ref 3.5–5.1)
NON-UH HIE KETONES, U: NEGATIVE
NON-UH HIE LEUKOCYTE ESTERASE, U: NEGATIVE
NON-UH HIE LYMPH %: 22.1 %
NON-UH HIE LYMPH COUNT: 1.49 X1000 (ref 1.2–4.8)
NON-UH HIE MAGNESIUM: 1.6 MG/DL (ref 1.6–2.6)
NON-UH HIE MCH: 28.2 PG (ref 27–34)
NON-UH HIE MCHC: 34 G/DL (ref 32–37)
NON-UH HIE MCV: 82.9 FL (ref 80–100)
NON-UH HIE MICROALBUMIN, URINE MG/L: 5 MG/L
NON-UH HIE MICROALBUMIN/CREATININE RATIO: 16 MG MALB/GM CREAT (ref 0–30)
NON-UH HIE MONO %: 8.3 %
NON-UH HIE MONO COUNT: 0.56 X1000 (ref 0.1–1)
NON-UH HIE MPV: 8.5 FL (ref 7.4–10.4)
NON-UH HIE NA: 142 MMOL/L (ref 135–145)
NON-UH HIE NEUTROPHIL %: 67.3 %
NON-UH HIE NEUTROPHIL COUNT (ANC): 4.54 X1000 (ref 1.4–8.8)
NON-UH HIE NITRITE, U: NEGATIVE
NON-UH HIE NUCLEATED RBC: 0 /100WBC
NON-UH HIE PH, U: 5 (ref 4.5–8)
NON-UH HIE PLATELET: 265 X10 (ref 150–450)
NON-UH HIE PROTEIN, U: NEGATIVE
NON-UH HIE RBC: 4.92 X10 (ref 4.2–5.4)
NON-UH HIE RDW: 13.9 % (ref 11.5–14.5)
NON-UH HIE SATURATION: 16.6 % (ref 20–50)
NON-UH HIE SPECIFIC GRAVITY, U: 1.01 (ref 1–1.03)
NON-UH HIE SQUAMOUS EPITHELIAL CELLS, U: 1 #/HPF
NON-UH HIE TIBC: 296 UG/ML (ref 250–425)
NON-UH HIE TOTAL CHOL/HDL CHOL RATIO: 3.3
NON-UH HIE TOTAL PROTEIN: 6.7 G/DL (ref 5.7–8.2)
NON-UH HIE TRIGLYCERIDES: 98 MG/DL (ref 30–150)
NON-UH HIE TSH: 1.41 UIU/ML (ref 0.55–4.78)
NON-UH HIE U MICRO: NORMAL
NON-UH HIE URIC ACID: 5.2 MG/DL (ref 3.1–7.8)
NON-UH HIE UROBILINOGEN QUAL, U: NORMAL
NON-UH HIE VITAMIN B12: 235 PG/ML (ref 211–911)
NON-UH HIE WBC: 6.8 X10 (ref 4.5–11)

## 2024-02-26 ENCOUNTER — TELEPHONE (OUTPATIENT)
Dept: PRIMARY CARE | Facility: CLINIC | Age: 69
End: 2024-02-26

## 2024-02-26 ENCOUNTER — OFFICE VISIT (OUTPATIENT)
Dept: PRIMARY CARE | Facility: CLINIC | Age: 69
End: 2024-02-26
Payer: MEDICARE

## 2024-02-26 VITALS
TEMPERATURE: 96.8 F | HEIGHT: 70 IN | WEIGHT: 223.4 LBS | OXYGEN SATURATION: 96 % | HEART RATE: 84 BPM | SYSTOLIC BLOOD PRESSURE: 127 MMHG | DIASTOLIC BLOOD PRESSURE: 64 MMHG | BODY MASS INDEX: 31.98 KG/M2

## 2024-02-26 DIAGNOSIS — I73.9 PERIPHERAL ARTERIAL DISEASE (CMS-HCC): ICD-10-CM

## 2024-02-26 DIAGNOSIS — E11.9 TYPE 2 DIABETES MELLITUS WITHOUT COMPLICATION, WITH LONG-TERM CURRENT USE OF INSULIN (MULTI): ICD-10-CM

## 2024-02-26 DIAGNOSIS — Z00.00 MEDICARE ANNUAL WELLNESS VISIT, SUBSEQUENT: Primary | ICD-10-CM

## 2024-02-26 DIAGNOSIS — E11.59 HYPERTENSION ASSOCIATED WITH DIABETES (MULTI): ICD-10-CM

## 2024-02-26 DIAGNOSIS — I63.032 CEREBROVASCULAR ACCIDENT (CVA) DUE TO THROMBOSIS OF LEFT CAROTID ARTERY (MULTI): ICD-10-CM

## 2024-02-26 DIAGNOSIS — Z11.59 NEED FOR HEPATITIS C SCREENING TEST: ICD-10-CM

## 2024-02-26 DIAGNOSIS — I15.2 HYPERTENSION ASSOCIATED WITH DIABETES (MULTI): ICD-10-CM

## 2024-02-26 DIAGNOSIS — Z79.4 TYPE 2 DIABETES MELLITUS WITHOUT COMPLICATION, WITH LONG-TERM CURRENT USE OF INSULIN (MULTI): ICD-10-CM

## 2024-02-26 DIAGNOSIS — I48.20 CHRONIC ATRIAL FIBRILLATION (MULTI): ICD-10-CM

## 2024-02-26 DIAGNOSIS — D50.0 IRON DEFICIENCY ANEMIA DUE TO CHRONIC BLOOD LOSS: ICD-10-CM

## 2024-02-26 PROBLEM — Z23 NEED FOR INFLUENZA VACCINATION: Status: RESOLVED | Noted: 2023-10-12 | Resolved: 2024-02-26

## 2024-02-26 PROBLEM — M81.0 AGE-RELATED OSTEOPOROSIS WITHOUT CURRENT PATHOLOGICAL FRACTURE: Status: RESOLVED | Noted: 2023-10-12 | Resolved: 2024-02-26

## 2024-02-26 PROCEDURE — 3074F SYST BP LT 130 MM HG: CPT | Performed by: INTERNAL MEDICINE

## 2024-02-26 PROCEDURE — 99497 ADVNCD CARE PLAN 30 MIN: CPT | Performed by: INTERNAL MEDICINE

## 2024-02-26 PROCEDURE — 1159F MED LIST DOCD IN RCRD: CPT | Performed by: INTERNAL MEDICINE

## 2024-02-26 PROCEDURE — 1036F TOBACCO NON-USER: CPT | Performed by: INTERNAL MEDICINE

## 2024-02-26 PROCEDURE — 1170F FXNL STATUS ASSESSED: CPT | Performed by: INTERNAL MEDICINE

## 2024-02-26 PROCEDURE — 1160F RVW MEDS BY RX/DR IN RCRD: CPT | Performed by: INTERNAL MEDICINE

## 2024-02-26 PROCEDURE — G0439 PPPS, SUBSEQ VISIT: HCPCS | Performed by: INTERNAL MEDICINE

## 2024-02-26 PROCEDURE — 3078F DIAST BP <80 MM HG: CPT | Performed by: INTERNAL MEDICINE

## 2024-02-26 PROCEDURE — 4010F ACE/ARB THERAPY RXD/TAKEN: CPT | Performed by: INTERNAL MEDICINE

## 2024-02-26 RX ORDER — PEN NEEDLE, DIABETIC 30 GX3/16"
NEEDLE, DISPOSABLE MISCELLANEOUS
Qty: 100 EACH | Refills: 3 | Status: SHIPPED | OUTPATIENT
Start: 2024-02-26

## 2024-02-26 RX ORDER — INSULIN DEGLUDEC 200 U/ML
INJECTION, SOLUTION SUBCUTANEOUS
Qty: 27 ML | Refills: 3 | Status: SHIPPED | OUTPATIENT
Start: 2024-02-26

## 2024-02-26 ASSESSMENT — PATIENT HEALTH QUESTIONNAIRE - PHQ9
SUM OF ALL RESPONSES TO PHQ9 QUESTIONS 1 AND 2: 0
1. LITTLE INTEREST OR PLEASURE IN DOING THINGS: NOT AT ALL
2. FEELING DOWN, DEPRESSED OR HOPELESS: NOT AT ALL
1. LITTLE INTEREST OR PLEASURE IN DOING THINGS: NOT AT ALL
SUM OF ALL RESPONSES TO PHQ9 QUESTIONS 1 AND 2: 0
2. FEELING DOWN, DEPRESSED OR HOPELESS: NOT AT ALL

## 2024-02-26 ASSESSMENT — ACTIVITIES OF DAILY LIVING (ADL)
TAKING_MEDICATION: INDEPENDENT
DOING_HOUSEWORK: NEEDS ASSISTANCE
DRESSING: INDEPENDENT
MANAGING_FINANCES: INDEPENDENT
BATHING: INDEPENDENT
GROCERY_SHOPPING: INDEPENDENT

## 2024-02-26 NOTE — PROGRESS NOTES
Assessment and Plan:  Problem List Items Addressed This Visit       Hypertension associated with diabetes (CMS/[facility])     Patients BP readings reviewed and addressed, as we age our arteries turn stiffer and less elastic. Restricting salt consumption and staying physically fit with regular exercise regimen is the only way to keep our vasculature less tonic. Studies have shown that keeping ideal body wt, exercise routine about 140 to 150 minutes a week, eating variety of plant based diet and drinking plentiful water are quite helpful. Monitor BP twice or once a week at home and bring log to be reviewed by me. Uncontrolled BP has long term consequences including heart failure, myocardial infarction, accelerated atherosclerosis and kidney dysfunction. Therapy reviewed and explained.           Peripheral arterial disease (CMS/[facility])    Chronic atrial fibrillation (CMS/[facility])     Pt has history AF, rate &/or rhythm therapy has been considered, Anticoagulation as listed and as appropriate. Atrial fibrillation is very common as we age. Fast ventricular rate or simply rate control strategy leads to poor quality of life with chronic fatigue, dyspnea and lack of endurance. Chadsvasc score has been looked into. Cardiology if managing QT prolongation as appropriate. If palpitations or SOB happens then please notify us. QOL can be maintained as good as we can with proper strategic therapy for AF.Chances of embolism or embolic events remain high without anticoagulation therapy.          Cerebrovascular accident (CVA) (CMS/[facility])    Medicare annual wellness visit, subsequent - Primary    Diabetes mellitus, type 2 (CMS/[facility])     Diabetes is chronic disease, it does not get cured but it can be controlled, in modern medicine there are variety of measures taken to control DM. Usually we want to preserve beta cell functions. Please understand the disease and how our life style can affect control of glycemia. Diabetes leads to macro and  "microvascular complications and they could be devastating. It is important to have annual eye check and frequent foot inspection and foot inspection. Kidney dysfunction including dialysis, foot amputations, neuropathy, foot ulcers and accelerated atherosclerotic vascular disease are known complications of uncontrolled DM, pt was educated and explained.           Relevant Medications    insulin degludec (Tresiba FlexTouch U-200) 200 unit/mL (3 mL) injection    pen needle, diabetic (BD Ultra-Fine Dulce Pen Needle) 32 gauge x 5/32\" needle    Other Relevant Orders    Referral to Ophthalmology    Iron deficiency anemia due to chronic blood loss     Other Visit Diagnoses       Need for hepatitis C screening test        Relevant Orders    Hepatitis C antibody              Chief Complaint:   Annual Medicare Wellness Office Exam/Comprehensive Problem Focused Follow Up and Physical Exam      HPI: 68-year-old patient  with 4 children    1 brother 1 sister brother have heart disease Sister have hypertension    Mother have a aneurysm    Father have a sepsis    History of aunt have breast cancer    History of the stroke 16 years ago with a left hemiplegia    Complaining of arthralgia myalgia fatigue tired obesity    Negative for headache chest pain hematuria rectal bleeding depression suicidal COVID or DVT or PE or any bleeding negative for low blood sugar hypoxia or hypotension or hypoglycemia        Patient Active Problem List:  Patient Active Problem List   Diagnosis    Hypertension associated with diabetes (CMS/HCC)    Peripheral arterial disease (CMS/HCC)    Chronic atrial fibrillation (CMS/HCC)    Cerebrovascular accident (CVA) (CMS/HCC)    Medicare annual wellness visit, subsequent    Diabetes mellitus, type 2 (CMS/HCC)    Iron deficiency anemia due to chronic blood loss          Comprehensive Medical/Surgical/Social/Family History:  Family History   Problem Relation Name Age of Onset    Hypertension Mother      " Diabetes Mother      Other (fibroid tumors) Mother      Diabetes Father      Heart disease Father         Past Medical History:   Diagnosis Date    Abscess of vulva 06/22/2020    Boil, labium    Body mass index (BMI) 31.0-31.9, adult 06/30/2022    BMI 31.0-31.9,adult    Candidiasis of skin and nail 12/05/2016    Skin yeast infection    Candidiasis of skin and nail     Cutaneous candidiasis    Candidiasis, unspecified 06/19/2020    Yeast infection    Contact with and (suspected) exposure to covid-19 02/08/2021    Suspected COVID-19 virus infection    Encounter for screening for malignant neoplasm of colon 06/27/2022    Screen for colon cancer    Encounter for screening for malignant neoplasm of rectum 06/27/2022    Screening for rectal cancer    Essential (primary) hypertension 07/17/2020    Benign essential hypertension    Fracture of orbit, unspecified, initial encounter for closed fracture (CMS/[facility]) 05/25/2021    Right orbital fracture    History of falling 12/02/2021    History of fall    Influenza due to unidentified influenza virus with other respiratory manifestations 03/05/2018    Bronchitis with flu    Osteoarthritis of knee, unspecified     Knee osteoarthritis    Overweight 06/30/2022    Overweight with body mass index (BMI) of 28 to 28.9 in adult    Pain in left shoulder 06/19/2020    Left shoulder pain    Personal history of other diseases of the circulatory system 12/02/2021    History of peripheral arterial disease    Personal history of other diseases of the digestive system 05/15/2019    History of constipation    Personal history of other diseases of the female genital tract 10/19/2017    History of vaginitis    Personal history of other diseases of the musculoskeletal system and connective tissue     Personal history of osteoporosis    Personal history of other diseases of the musculoskeletal system and connective tissue 11/11/2019    History of muscle spasm    Personal history of other endocrine,  nutritional and metabolic disease 2022    History of vitamin D deficiency    Personal history of other endocrine, nutritional and metabolic disease 2021    History of hypothyroidism    Personal history of other endocrine, nutritional and metabolic disease 2020    History of morbid obesity    Personal history of other endocrine, nutritional and metabolic disease 2020    History of hyperlipidemia    Personal history of other endocrine, nutritional and metabolic disease 2020    History of hypokalemia    Personal history of other infectious and parasitic diseases     History of mumps    Personal history of other infectious and parasitic diseases     History of measles    Personal history of other infectious and parasitic diseases     History of varicella    Personal history of pneumonia (recurrent)     History of pneumonia    Personal history of traumatic brain injury 2021    History of closed head injury    Personal history of urinary (tract) infections 2021    History of urinary tract infection    Type 2 diabetes mellitus with unspecified complications (CMS/[facility]) 2017    Type 2 diabetes mellitus with complication, unspecified long term insulin use status    Unspecified dislocation of left little finger, initial encounter     Dislocation of fifth finger, left, closed       Past Surgical History:   Procedure Laterality Date     SECTION, CLASSIC  2014     Section    SHOULDER SURGERY  2014    Shoulder Surgery    TONSILLECTOMY  2014    Tonsillectomy With Adenoidectomy       Social History     Socioeconomic History    Marital status:      Spouse name: None    Number of children: None    Years of education: None    Highest education level: None   Occupational History    None   Tobacco Use    Smoking status: Never    Smokeless tobacco: Never   Substance and Sexual Activity    Alcohol use: Yes     Comment: rare    Drug use: Never    Sexual  "activity: None   Other Topics Concern    None   Social History Narrative    None     Social Determinants of Health     Financial Resource Strain: Not on file   Food Insecurity: Not on file   Transportation Needs: Not on file   Physical Activity: Not on file   Stress: Not on file   Social Connections: Not on file   Intimate Partner Violence: Not on file   Housing Stability: Not on file       Tobacco/Alcohol/Opioid use, as well as Illicit Drug Use was screened for/reviewed and documented in Social Documentation section of the chart and medication list as appropriate    Allergies and Medications  Allergies   Allergen Reactions    Cat Dander Swelling    Penicillins Other     PCN, did not work on patient.    Tree And Shrub Pollen Hives     Current Outpatient Medications   Medication Sig Dispense Refill    amLODIPine (Norvasc) 5 mg tablet TAKE 1 TABLET TWICE A  tablet 3    calcium carbonate-vitamin D3 600 mg-5 mcg (200 unit) tablet Take by mouth once daily.      Eliquis 5 mg tablet TAKE 1 TABLET TWICE A  tablet 3    flecainide (Tambocor) 100 mg tablet TAKE 1 TABLET EVERY 12 HOURS 180 tablet 3    furosemide (Lasix) 20 mg tablet TAKE 1 TABLET DAILY 90 tablet 3    lisinopril 20 mg tablet Take 1 tablet (20 mg) by mouth once daily. 90 tablet 3    metFORMIN (Glucophage) 500 mg tablet TAKE 1 TABLET THREE TIMES A  tablet 3    metoprolol tartrate (Lopressor) 50 mg tablet Take 1 tablet (50 mg) by mouth once daily. (Patient taking differently: Take 0.5 tablets by mouth 2 times a day.) 90 tablet 3    potassium chloride CR (Klor-Con M20) 20 mEq ER tablet TAKE 1 TABLET DAILY 90 tablet 3    rosuvastatin (Crestor) 10 mg tablet TAKE 1 TABLET DAILY 90 tablet 3    zinc acetate 50 mg (zinc) capsule 50 mg.      insulin degludec (Tresiba FlexTouch U-200) 200 unit/mL (3 mL) injection INJECT SUBCUTANEOUSLY 58 UNITS  ONCE DAILY 27 mL 3    pen needle, diabetic (BD Ultra-Fine Dulce Pen Needle) 32 gauge x 5/32\" needle INJECTS " ONCE A  each 3     No current facility-administered medications for this visit.       Medications and Supplements  prescribed by me and other practitioners or clinical pharmacist (such as prescriptions, OTC's, herbal therapies and supplements) were reviewed and documented in the medical record.     Advance directives  Advanced Care Planning (including a Living Will, Healthcare POA, as well as specific end of life choices and/or directives), was discussed for approximately 16 minutes with the patient and/or surrogate, voluntarily, and documented in the Problem List of the medical record.     Cardiac Risk Assessment  Cardiovascular risk was discussed and, if needed, lifestyle modifications recommended, including nutritional choices, exercise, and elimination of habits contributing to risk. We agreed on a plan to reduce the current cardiovascular risk based on above discussion as needed.  Aspirin use/disuse was discussed and documented in the Problem List of the medical record after reviewing the updated guidelines below:    Consider low dose Aspirin ( mg) use if the benefit for cardiovascular disease prevention outweighs risk for bleeding complications.   In general, low dose ASA should be considered:  In patients WITHOUT prior MI/stroke/PAD (primary prevention):   a. Age <60: Use if 10-year cardiovascular disease risk >20%, with discussion of risks and benefits with patient  b. Age 60-<70: Use if 10-year cardiovascular disease risk >20% and low bleeding (e.g., gastrointenstinal) risk, with discussion of risks and benefits with patient  c. Age >=70: Do not use    In patients WITH prior MI/stroke/PAD (secondary prevention):   Generally use unless extremely high bleeding (e.g., gastrointenstinal) risk, with discussion of risks and benefits with patient    ROS otherwise negative aside from what was mentioned above in HPI.    Visit Vitals  /64 (BP Location: Right arm, Patient Position: Sitting, BP Cuff  "Size: Large adult)   Pulse 84   Temp 36 °C (96.8 °F)   Ht 1.778 m (5' 10\")   Wt 101 kg (223 lb 6.4 oz)   SpO2 96%   BMI 32.05 kg/m²   Smoking Status Never   BSA 2.23 m²       Physical Exam  Physical Exam:    Appearance: Alert, oriented , cooperative,  in no acute distress. Well nourished & well hydrated.    Skin: Intact,  dry skin, no lesions, rash, petechiae or purpura.     Eyes: PERRLA, EOMs intact,  Conjunctiva pink with no redness or exudates. Cornea & anterior chamber are clear, Eyelids without lesions. No scleral icterus.     ENT: Hearing grossly intact. External auditory canals patent, tympanic membranes intact with visible landmarks. Nares patent, mucus membranes moist. Dentition without lesions. Pharynx clear, uvula midline.     Neck: Supple, without meningismus. Thyroid not palpable. Trachea at midline. No lymphadenopathy.    Pulmonary: Crackles.    Cardiac: Irregular.    Abdomen: Soft, nontender, active bowel sounds.  No palpable organomegaly.  No rebound or guarding.  No CVA tenderness.    Genitourinary: Exam deferred.    Musculoskeletal: Arthritis of shoulder.    Neurological: Diabetic neuropathy.    Psychiatric: Appropriate mood and affect.         During the course of the visit the patient was educated and counseled about age appropriate screening and preventive services. Completed preventive screenings were documented in the chart and orders were placed for outstanding screenings/procedures as documented in the Assessment and Plan.    Patient Instructions (the written plan) was given to the patient at check out.    Charting was completed using voice recognition technology and may include unintended errors.  "

## 2024-02-26 NOTE — TELEPHONE ENCOUNTER
----- Message from Tosha Lamb MD sent at 2/26/2024  9:31 AM EST -----  HDL 39 iron 49 low hemoglobin A1c 7 advised Slow Fe 1 a day low-carb diet

## 2024-03-06 ENCOUNTER — TELEPHONE (OUTPATIENT)
Dept: PRIMARY CARE | Facility: CLINIC | Age: 69
End: 2024-03-06
Payer: MEDICARE

## 2024-03-06 NOTE — TELEPHONE ENCOUNTER
PT has received 7 messages to see Ophthalmology because her and her  was provided an order to do so from Dr Lamb.      They have their own Ophthalmologist.  Can the orders be deleted so the calls stop.

## 2024-05-08 DIAGNOSIS — I15.2 HYPERTENSION ASSOCIATED WITH DIABETES (MULTI): ICD-10-CM

## 2024-05-08 DIAGNOSIS — E11.59 HYPERTENSION ASSOCIATED WITH DIABETES (MULTI): ICD-10-CM

## 2024-05-08 DIAGNOSIS — I10 HYPERTENSION, UNSPECIFIED TYPE: ICD-10-CM

## 2024-05-09 DIAGNOSIS — Z79.4 TYPE 2 DIABETES MELLITUS WITHOUT COMPLICATION, WITH LONG-TERM CURRENT USE OF INSULIN (MULTI): ICD-10-CM

## 2024-05-09 DIAGNOSIS — E78.5 HYPERLIPIDEMIA, UNSPECIFIED HYPERLIPIDEMIA TYPE: ICD-10-CM

## 2024-05-09 DIAGNOSIS — E11.9 TYPE 2 DIABETES MELLITUS WITHOUT COMPLICATION, WITH LONG-TERM CURRENT USE OF INSULIN (MULTI): ICD-10-CM

## 2024-05-09 DIAGNOSIS — I48.20 CHRONIC ATRIAL FIBRILLATION (MULTI): ICD-10-CM

## 2024-05-09 DIAGNOSIS — E11.59 HYPERTENSION ASSOCIATED WITH DIABETES (MULTI): ICD-10-CM

## 2024-05-09 DIAGNOSIS — I10 HYPERTENSION, UNSPECIFIED TYPE: ICD-10-CM

## 2024-05-09 DIAGNOSIS — I15.2 HYPERTENSION ASSOCIATED WITH DIABETES (MULTI): ICD-10-CM

## 2024-05-09 RX ORDER — METFORMIN HYDROCHLORIDE 500 MG/1
500 TABLET ORAL 3 TIMES DAILY
Qty: 270 TABLET | Refills: 3 | Status: SHIPPED | OUTPATIENT
Start: 2024-05-09

## 2024-05-09 RX ORDER — METOPROLOL TARTRATE 25 MG/1
25 TABLET, FILM COATED ORAL 2 TIMES DAILY
Qty: 180 TABLET | Refills: 3 | Status: SHIPPED | OUTPATIENT
Start: 2024-05-09

## 2024-05-09 RX ORDER — FLECAINIDE ACETATE 100 MG/1
100 TABLET ORAL EVERY 12 HOURS
Qty: 180 TABLET | Refills: 3 | Status: SHIPPED | OUTPATIENT
Start: 2024-05-09

## 2024-05-09 RX ORDER — APIXABAN 5 MG/1
5 TABLET, FILM COATED ORAL 2 TIMES DAILY
Qty: 180 TABLET | Refills: 3 | Status: SHIPPED | OUTPATIENT
Start: 2024-05-09

## 2024-05-09 RX ORDER — AMLODIPINE BESYLATE 5 MG/1
5 TABLET ORAL 2 TIMES DAILY
Qty: 180 TABLET | Refills: 3 | Status: SHIPPED | OUTPATIENT
Start: 2024-05-09

## 2024-05-09 RX ORDER — ROSUVASTATIN CALCIUM 10 MG/1
10 TABLET, COATED ORAL DAILY
Qty: 90 TABLET | Refills: 3 | Status: SHIPPED | OUTPATIENT
Start: 2024-05-09

## 2024-05-09 RX ORDER — POTASSIUM CHLORIDE 20 MEQ/1
20 TABLET, EXTENDED RELEASE ORAL DAILY
Qty: 90 TABLET | Refills: 3 | Status: SHIPPED | OUTPATIENT
Start: 2024-05-09

## 2024-05-09 RX ORDER — FUROSEMIDE 20 MG/1
20 TABLET ORAL DAILY
Qty: 90 TABLET | Refills: 3 | Status: SHIPPED | OUTPATIENT
Start: 2024-05-09

## 2024-06-18 LAB — HEMOGLOBIN A1C/HEMOGLOBIN TOTAL IN BLOOD EXTERNAL: 7 %

## 2024-06-24 ENCOUNTER — APPOINTMENT (OUTPATIENT)
Dept: PRIMARY CARE | Facility: CLINIC | Age: 69
End: 2024-06-24
Payer: MEDICARE

## 2024-07-08 ENCOUNTER — TELEPHONE (OUTPATIENT)
Dept: PRIMARY CARE | Facility: CLINIC | Age: 69
End: 2024-07-08
Payer: MEDICARE

## 2024-07-08 LAB
NON-UH HIE A/G RATIO: 1.2
NON-UH HIE ALB: 3.7 G/DL (ref 3.4–5)
NON-UH HIE ALK PHOS: 46 UNIT/L (ref 45–117)
NON-UH HIE APPEARANCE, U: CLEAR
NON-UH HIE BASO COUNT: 0.07 X1000 (ref 0–0.2)
NON-UH HIE BASOS %: 1.2 %
NON-UH HIE BILIRUBIN, TOTAL: 0.9 MG/DL (ref 0.3–1.2)
NON-UH HIE BILIRUBIN, U: NEGATIVE
NON-UH HIE BLOOD, U: NEGATIVE
NON-UH HIE BUN/CREAT RATIO: 23.8
NON-UH HIE BUN: 19 MG/DL (ref 9–23)
NON-UH HIE CALCIUM: 9.7 MG/DL (ref 8.7–10.4)
NON-UH HIE CALCULATED LDL CHOLESTEROL: 74 MG/DL (ref 60–130)
NON-UH HIE CALCULATED OSMOLALITY: 283 MOSM/KG (ref 275–295)
NON-UH HIE CHLORIDE: 105 MMOL/L (ref 98–107)
NON-UH HIE CHOLESTEROL: 137 MG/DL (ref 100–200)
NON-UH HIE CO2, VENOUS: 30 MMOL/L (ref 20–31)
NON-UH HIE COLOR, U: ABNORMAL
NON-UH HIE CREATININE, URINE MG/DL: 34.9 MG/DL
NON-UH HIE CREATININE: 0.8 MG/DL (ref 0.5–0.8)
NON-UH HIE DIFF?: NO
NON-UH HIE EOS COUNT: 0.1 X1000 (ref 0–0.5)
NON-UH HIE EOSIN %: 1.6 %
NON-UH HIE FERRITIN: 101 NG/ML (ref 10–291)
NON-UH HIE GFR AA: >60
NON-UH HIE GLOBULIN: 3.2 G/DL
NON-UH HIE GLOMERULAR FILTRATION RATE: >60 ML/MIN/1.73M?
NON-UH HIE GLUCOSE QUAL, U: NEGATIVE
NON-UH HIE GLUCOSE: 85 MG/DL (ref 74–106)
NON-UH HIE GOT: 13 UNIT/L (ref 15–37)
NON-UH HIE GPT: 13 UNIT/L (ref 10–49)
NON-UH HIE HCT: 40.9 % (ref 36–46)
NON-UH HIE HDL CHOLESTEROL: 43 MG/DL (ref 40–60)
NON-UH HIE HEPATITIS C ANTIBODY: NONREACTIVE
NON-UH HIE HGB A1C: 7.3 %
NON-UH HIE HGB: 14 G/DL (ref 12–16)
NON-UH HIE INSTR WBC: 6.2
NON-UH HIE IRON: 54 UG/DL (ref 50–170)
NON-UH HIE K: 4 MMOL/L (ref 3.5–5.1)
NON-UH HIE KETONES, U: NEGATIVE
NON-UH HIE LEUKOCYTE ESTERASE, U: ABNORMAL
NON-UH HIE LYMPH %: 26.3 %
NON-UH HIE LYMPH COUNT: 1.63 X1000 (ref 1.2–4.8)
NON-UH HIE MAGNESIUM: 1.8 MG/DL (ref 1.6–2.6)
NON-UH HIE MCH: 28.1 PG (ref 27–34)
NON-UH HIE MCHC: 34.2 G/DL (ref 32–37)
NON-UH HIE MCV: 82.3 FL (ref 80–100)
NON-UH HIE MICROALBUMIN, URINE MG/L: 18 MG/L
NON-UH HIE MICROALBUMIN/CREATININE RATIO: 52 MG MALB/GM CREAT (ref 0–30)
NON-UH HIE MONO %: 7.4 %
NON-UH HIE MONO COUNT: 0.46 X1000 (ref 0.1–1)
NON-UH HIE MPV: 7.9 FL (ref 7.4–10.4)
NON-UH HIE NA: 141 MMOL/L (ref 135–145)
NON-UH HIE NEUTROPHIL %: 63.5 %
NON-UH HIE NEUTROPHIL COUNT (ANC): 3.92 X1000 (ref 1.4–8.8)
NON-UH HIE NITRITE, U: NEGATIVE
NON-UH HIE NUCLEATED RBC: 0 /100WBC
NON-UH HIE PH, U: 6 (ref 4.5–8)
NON-UH HIE PLATELET: 278 X10 (ref 150–450)
NON-UH HIE PROTEIN, U: NEGATIVE
NON-UH HIE RBC/HPF, U: <1 #/HPF (ref 0–3)
NON-UH HIE RBC: 4.97 X10 (ref 4.2–5.4)
NON-UH HIE RDW: 13.8 % (ref 11.5–14.5)
NON-UH HIE SATURATION: 17.6 % (ref 20–50)
NON-UH HIE SPECIFIC GRAVITY, U: 1.01 (ref 1–1.03)
NON-UH HIE SQUAMOUS EPITHELIAL CELLS, U: <1 #/HPF
NON-UH HIE TIBC: 306 UG/ML (ref 250–425)
NON-UH HIE TOTAL CHOL/HDL CHOL RATIO: 3.2
NON-UH HIE TOTAL PROTEIN: 6.9 G/DL (ref 5.7–8.2)
NON-UH HIE TRIGLYCERIDES: 99 MG/DL (ref 30–150)
NON-UH HIE TSH: 1.47 UIU/ML (ref 0.55–4.78)
NON-UH HIE U MICRO: ABNORMAL
NON-UH HIE URIC ACID: 4.9 MG/DL (ref 3.1–7.8)
NON-UH HIE UROBILINOGEN QUAL, U: ABNORMAL
NON-UH HIE VITAMIN B12: 267 PG/ML (ref 211–911)
NON-UH HIE WBC/HPF, U: 1 #/HPF (ref 0–5)
NON-UH HIE WBC: 6.2 X10 (ref 4.5–11)

## 2024-07-08 NOTE — TELEPHONE ENCOUNTER
----- Message from Tosha Lamb MD sent at 7/8/2024  3:02 PM EDT -----  Mild UTI anemia drink a lot of water green leafy vegetables    Hemoglobin A1c 7.3 continue diet exercise follow-up every 3 to 4 months advised to ophthalmologist for diabetic retinopathy advised to get COVID and zoster vaccine from the pharmacy

## 2024-07-09 ENCOUNTER — APPOINTMENT (OUTPATIENT)
Dept: PRIMARY CARE | Facility: CLINIC | Age: 69
End: 2024-07-09
Payer: MEDICARE

## 2024-07-09 ENCOUNTER — OFFICE VISIT (OUTPATIENT)
Dept: PRIMARY CARE | Facility: CLINIC | Age: 69
End: 2024-07-09
Payer: MEDICARE

## 2024-07-09 VITALS
WEIGHT: 225 LBS | BODY MASS INDEX: 32.21 KG/M2 | DIASTOLIC BLOOD PRESSURE: 69 MMHG | HEART RATE: 85 BPM | OXYGEN SATURATION: 98 % | SYSTOLIC BLOOD PRESSURE: 136 MMHG | HEIGHT: 70 IN

## 2024-07-09 DIAGNOSIS — E78.2 DM TYPE 2 WITH DIABETIC MIXED HYPERLIPIDEMIA (MULTI): ICD-10-CM

## 2024-07-09 DIAGNOSIS — I15.2 HYPERTENSION ASSOCIATED WITH DIABETES (MULTI): ICD-10-CM

## 2024-07-09 DIAGNOSIS — E11.69 DM TYPE 2 WITH DIABETIC MIXED HYPERLIPIDEMIA (MULTI): ICD-10-CM

## 2024-07-09 DIAGNOSIS — I10 HYPERTENSION, UNSPECIFIED TYPE: ICD-10-CM

## 2024-07-09 DIAGNOSIS — I48.20 CHRONIC ATRIAL FIBRILLATION (MULTI): ICD-10-CM

## 2024-07-09 DIAGNOSIS — E11.59 HYPERTENSION ASSOCIATED WITH DIABETES (MULTI): ICD-10-CM

## 2024-07-09 DIAGNOSIS — E11.9 DIABETES MELLITUS WITH COINCIDENT HYPERTENSION (MULTI): ICD-10-CM

## 2024-07-09 DIAGNOSIS — I10 DIABETES MELLITUS WITH COINCIDENT HYPERTENSION (MULTI): ICD-10-CM

## 2024-07-09 DIAGNOSIS — E11.49 DIABETES MELLITUS TYPE 2 WITH NEUROLOGICAL MANIFESTATIONS (MULTI): Primary | ICD-10-CM

## 2024-07-09 DIAGNOSIS — I63.89 CEREBROVASCULAR ACCIDENT (CVA) DUE TO OTHER MECHANISM (MULTI): ICD-10-CM

## 2024-07-09 PROBLEM — I73.9 PERIPHERAL ARTERIAL DISEASE (CMS-HCC): Status: RESOLVED | Noted: 2023-06-15 | Resolved: 2024-07-09

## 2024-07-09 PROBLEM — D50.0 IRON DEFICIENCY ANEMIA DUE TO CHRONIC BLOOD LOSS: Status: RESOLVED | Noted: 2024-02-26 | Resolved: 2024-07-09

## 2024-07-09 PROBLEM — Z00.00 MEDICARE ANNUAL WELLNESS VISIT, SUBSEQUENT: Status: RESOLVED | Noted: 2023-06-15 | Resolved: 2024-07-09

## 2024-07-09 PROCEDURE — 1159F MED LIST DOCD IN RCRD: CPT | Performed by: INTERNAL MEDICINE

## 2024-07-09 PROCEDURE — 1036F TOBACCO NON-USER: CPT | Performed by: INTERNAL MEDICINE

## 2024-07-09 PROCEDURE — 3078F DIAST BP <80 MM HG: CPT | Performed by: INTERNAL MEDICINE

## 2024-07-09 PROCEDURE — 3075F SYST BP GE 130 - 139MM HG: CPT | Performed by: INTERNAL MEDICINE

## 2024-07-09 PROCEDURE — 4010F ACE/ARB THERAPY RXD/TAKEN: CPT | Performed by: INTERNAL MEDICINE

## 2024-07-09 PROCEDURE — 2028F FOOT EXAM PERFORMED: CPT | Performed by: INTERNAL MEDICINE

## 2024-07-09 PROCEDURE — 3051F HG A1C>EQUAL 7.0%<8.0%: CPT | Performed by: INTERNAL MEDICINE

## 2024-07-09 PROCEDURE — 3008F BODY MASS INDEX DOCD: CPT | Performed by: INTERNAL MEDICINE

## 2024-07-09 PROCEDURE — 99214 OFFICE O/P EST MOD 30 MIN: CPT | Performed by: INTERNAL MEDICINE

## 2024-07-09 PROCEDURE — 1160F RVW MEDS BY RX/DR IN RCRD: CPT | Performed by: INTERNAL MEDICINE

## 2024-07-09 RX ORDER — LISINOPRIL 20 MG/1
20 TABLET ORAL
Qty: 90 TABLET | Refills: 3 | Status: SHIPPED | OUTPATIENT
Start: 2024-07-09

## 2024-07-09 NOTE — PROGRESS NOTES
"Subjective   Patient ID: Viki Boykin \"Deon" is a 68 y.o. female who presents for Follow-up (4 month /Go over blood work ).    Assessment/Plan     Problem List Items Addressed This Visit       Hypertension - Primary     Patients BP readings reviewed and addressed, as we age our arteries turn stiffer and less elastic. Restricting salt consumption and staying physically fit with regular exercise regimen is the only way to keep our vasculature less tonic. Studies have shown that keeping ideal body wt, exercise routine about 140 to 150 minutes a week, eating variety of plant based diet and drinking plentiful water are quite helpful. Monitor BP twice or once a week at home and bring log to be reviewed by me. Uncontrolled BP has long term consequences including heart failure, myocardial infarction, accelerated atherosclerosis and kidney dysfunction. Therapy reviewed and explained.           Relevant Medications    lisinopril 20 mg tablet    Chronic atrial fibrillation (Multi)     Pt has history AF, rate &/or rhythm therapy has been considered, Anticoagulation as listed and as appropriate. Atrial fibrillation is very common as we age. Fast ventricular rate or simply rate control strategy leads to poor quality of life with chronic fatigue, dyspnea and lack of endurance. Chadsvasc score has been looked into. Cardiology if managing QT prolongation as appropriate. If palpitations or SOB happens then please notify us. QOL can be maintained as good as we can with proper strategic therapy for AF.Chances of embolism or embolic events remain high without anticoagulation therapy.          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)    DM type 2 with diabetic mixed hyperlipidemia (Multi)     Other Visit Diagnoses       Adult BMI 32.0-32.9 kg/sq m          "   Mild UTI anemia drink a lot of water green leafy vegetables     Hemoglobin A1c 7.3 continue diet exercise follow-up every 3 to 4 months advised to ophthalmologist for diabetic retinopathy advised to get COVID and zoster vaccine from the pharmacy    HPI 68-year-old patient have a CVA A-fib diabetes hypertension hyperlipidemia obesity complaining of the poor diet exercise recently had A1c went up blood sugar went up blood pressure LDL cholesterol was good    Negative for headache chest pain PND orthopnea or fall    Negative for hypoxia hypoglycemia or hypotension    No fever no chills no headache no chest pain no palpitation.  Review laboratory medication discussed with the patient's and patient's caregiver that this patient  who accompanied with the patient.  Past Medical History:   Diagnosis Date    Abscess of vulva 06/22/2020    Boil, labium    Body mass index (BMI) 31.0-31.9, adult 06/30/2022    BMI 31.0-31.9,adult    Candidiasis of skin and nail 12/05/2016    Skin yeast infection    Candidiasis of skin and nail     Cutaneous candidiasis    Candidiasis, unspecified 06/19/2020    Yeast infection    Contact with and (suspected) exposure to covid-19 02/08/2021    Suspected COVID-19 virus infection    Encounter for screening for malignant neoplasm of colon 06/27/2022    Screen for colon cancer    Encounter for screening for malignant neoplasm of rectum 06/27/2022    Screening for rectal cancer    Essential (primary) hypertension 07/17/2020    Benign essential hypertension    Fracture of orbit, unspecified, initial encounter for closed fracture (Multi) 05/25/2021    Right orbital fracture    History of falling 12/02/2021    History of fall    Influenza due to unidentified influenza virus with other respiratory manifestations 03/05/2018    Bronchitis with flu    Osteoarthritis of knee, unspecified     Knee osteoarthritis    Overweight 06/30/2022    Overweight with body mass index (BMI) of 28 to 28.9 in adult     Pain in left shoulder 06/19/2020    Left shoulder pain    Personal history of other diseases of the circulatory system 12/02/2021    History of peripheral arterial disease    Personal history of other diseases of the digestive system 05/15/2019    History of constipation    Personal history of other diseases of the female genital tract 10/19/2017    History of vaginitis    Personal history of other diseases of the musculoskeletal system and connective tissue     Personal history of osteoporosis    Personal history of other diseases of the musculoskeletal system and connective tissue 11/11/2019    History of muscle spasm    Personal history of other endocrine, nutritional and metabolic disease 11/04/2022    History of vitamin D deficiency    Personal history of other endocrine, nutritional and metabolic disease 12/02/2021    History of hypothyroidism    Personal history of other endocrine, nutritional and metabolic disease 06/19/2020    History of morbid obesity    Personal history of other endocrine, nutritional and metabolic disease 06/19/2020    History of hyperlipidemia    Personal history of other endocrine, nutritional and metabolic disease 02/18/2020    History of hypokalemia    Personal history of other infectious and parasitic diseases     History of mumps    Personal history of other infectious and parasitic diseases     History of measles    Personal history of other infectious and parasitic diseases     History of varicella    Personal history of pneumonia (recurrent)     History of pneumonia    Personal history of traumatic brain injury 12/02/2021    History of closed head injury    Personal history of urinary (tract) infections 02/09/2021    History of urinary tract infection    Type 2 diabetes mellitus with unspecified complications (Multi) 02/01/2017    Type 2 diabetes mellitus with complication, unspecified long term insulin use status    Unspecified dislocation of left little finger, initial  "encounter     Dislocation of fifth finger, left, closed     Past Surgical History:   Procedure Laterality Date     SECTION, CLASSIC  2014     Section    SHOULDER SURGERY  2014    Shoulder Surgery    TONSILLECTOMY  2014    Tonsillectomy With Adenoidectomy     Allergies   Allergen Reactions    Cat Dander Swelling    Penicillins Other     PCN, did not work on patient.    Tree And Shrub Pollen Hives     Current Outpatient Medications   Medication Sig Dispense Refill    amLODIPine (Norvasc) 5 mg tablet TAKE 1 TABLET BY MOUTH TWICE  DAILY 180 tablet 3    calcium carbonate-vitamin D3 600 mg-5 mcg (200 unit) tablet Take by mouth once daily.      Eliquis 5 mg tablet TAKE 1 TABLET BY MOUTH TWICE  DAILY 180 tablet 3    flecainide (Tambocor) 100 mg tablet TAKE 1 TABLET BY MOUTH EVERY 12  HOURS 180 tablet 3    furosemide (Lasix) 20 mg tablet TAKE 1 TABLET BY MOUTH DAILY 90 tablet 3    insulin degludec (Tresiba FlexTouch U-200) 200 unit/mL (3 mL) injection INJECT SUBCUTANEOUSLY 58 UNITS  ONCE DAILY 27 mL 3    lisinopril 20 mg tablet Take 1 tablet (20 mg) by mouth once daily. 90 tablet 3    metFORMIN (Glucophage) 500 mg tablet TAKE 1 TABLET BY MOUTH 3 TIMES  DAILY 270 tablet 3    metoprolol tartrate (Lopressor) 25 mg tablet Take 1 tablet by mouth 2 times a day. 180 tablet 3    pen needle, diabetic (BD Ultra-Fine Dulce Pen Needle) 32 gauge x 5/32\" needle INJECTS ONCE A  each 3    potassium chloride CR 20 mEq ER tablet TAKE 1 TABLET BY MOUTH DAILY 90 tablet 3    rosuvastatin (Crestor) 10 mg tablet TAKE 1 TABLET BY MOUTH DAILY 90 tablet 3    zinc acetate 50 mg (zinc) capsule 50 mg.       No current facility-administered medications for this visit.     Family History   Problem Relation Name Age of Onset    Hypertension Mother      Diabetes Mother      Other (fibroid tumors) Mother      Diabetes Father      Heart disease Father       Social History     Socioeconomic History    Marital status: "      Spouse name: None    Number of children: None    Years of education: None    Highest education level: None   Occupational History    None   Tobacco Use    Smoking status: Never    Smokeless tobacco: Never   Substance and Sexual Activity    Alcohol use: Yes     Comment: rare    Drug use: Never    Sexual activity: None   Other Topics Concern    None   Social History Narrative    None     Social Determinants of Health     Financial Resource Strain: Not on file   Food Insecurity: Not on file   Transportation Needs: Not on file   Physical Activity: Not on file   Stress: Not on file   Social Connections: Not on file   Intimate Partner Violence: Not on file   Housing Stability: Not on file     Immunization History   Administered Date(s) Administered    Flu vaccine (IIV4), preservative free *Check age/dose* 01/09/2018, 09/24/2021    Flu vaccine, quadrivalent, high-dose, preservative free, age 65y+ (FLUZONE) 10/12/2023    Influenza, Unspecified 11/16/2009, 10/12/2010, 11/12/2012, 08/29/2013, 09/22/2014, 12/01/2015, 09/12/2016, 09/07/2018, 09/09/2019, 10/13/2020, 10/18/2022    Influenza, seasonal, injectable 11/16/2009, 10/12/2010, 11/12/2012, 08/29/2013, 09/22/2014, 12/01/2015, 09/12/2016, 09/07/2018, 10/13/2020    Influenza, seasonal, injectable, preservative free 12/01/2015, 09/12/2016    Novel influenza-H1N1-09, preservative-free 01/13/2010    Pfizer Gray Cap SARS-CoV-2 04/30/2022    Pfizer Purple Cap SARS-CoV-2 03/30/2021, 04/20/2021    Pneumococcal conjugate vaccine, 20-valent (PREVNAR 20) 02/13/2023    Pneumococcal polysaccharide vaccine, 23-valent, age 2 years and older (PNEUMOVAX 23) 06/19/2020    RSV, 60 Years And Older (AREXVY) 01/03/2024    SARS-CoV-2, Unspecified 04/30/2022    Td vaccine, age 7 years and older (TDVAX) 11/20/2007    Tdap vaccine, age 7 year and older (BOOSTRIX, ADACEL) 05/23/2021    Tetanus toxoid, adsorbed 11/20/2007    Zoster vaccine, recombinant, adult (SHINGRIX) 01/18/2024,  "04/19/2024       Review of Systems  Review of systems is otherwise negative unless stated above or in history of present illness.    Objective   Visit Vitals  /69   Pulse 85   Ht 1.778 m (5' 10\")   Wt 102 kg (225 lb)   SpO2 98%   BMI 32.28 kg/m²   Smoking Status Never   BSA 2.24 m²     Physical Exam  Cardiovascular:      Pulses:           Dorsalis pedis pulses are 2+ on the right side and 2+ on the left side.        Posterior tibial pulses are 2+ on the right side and 2+ on the left side.   Musculoskeletal:      Right foot: No deformity.      Left foot: No deformity.   Feet:      Right foot:      Protective Sensation: 5 sites tested.        Skin integrity: Skin integrity normal.      Toenail Condition: Right toenails are normal.      Left foot:      Protective Sensation: 5 sites tested.        Skin integrity: Skin integrity normal.      Toenail Condition: Left toenails are normal.       Constitutional: BMI 32     General: not in acute distress.   HENT:      Head: Normocephalic and atraumatic.      Nose: Nose normal.   Eyes: No jaundice     Extraocular Movements: Extraocular movements intact.      Conjunctiva/sclera: Conjunctivae normal.   Cardiovascular: Irregular     Rate and Rhythm: Normal rate ,  No M/R/G  Pulmonary:      Effort: Pulmonary effort is normal.      Breath sounds: Normal, Bilat Equal AE  Skin: Dry skin     General: Skin is warm.   Neurological: Left hemiplegia     Mental Status: He is alert and oriented to person, place, and time.   Psychiatric:    Anxiety without depression     Mood and Affect: Mood normal.         Behavior: Behavior normal.   Musculoskeletal osteopenia osteoarthritis  FROM in all extremitirs,  Joint-no swelling or tenderness    Orders Only on 07/08/2024   Component Date Value Ref Range Status    [facility] Nucleated RBC 07/08/2024 0  /100WBC Final    [facility] HCT 07/08/2024 40.9  36.0 - 46.0 % Final    [facility] Platelet 07/08/2024 278  150 - 450 x10 Final    [facility] " RBC 07/08/2024 4.97  4.20 - 5.40 x10 Final    [facility] Instr WBC 07/08/2024 6.2   Final    [facility] MCHC 07/08/2024 34.2  32.0 - 37.0 g/dL Final    [facility] MCV 07/08/2024 82.3  80.0 - 100.0 fL Final    [facility] HGB 07/08/2024 14.0  12.0 - 16.0 g/dL Final    [facility] MPV 07/08/2024 7.9  7.4 - 10.4 fL Final    [facility] RDW 07/08/2024 13.8  11.5 - 14.5 % Final    [facility] WBC 07/08/2024 6.2  4.5 - 11.0 x10 Final    [facility] MCH 07/08/2024 28.1  27.0 - 34.0 pg Final    [facility] DIFF? 07/08/2024 No   Final    [facility] Lymph Count 07/08/2024 1.63  1.20 - 4.80 x1000 Final    [facility] Basos % 07/08/2024 1.2  % Final    [facility] Baso Count 07/08/2024 0.07  0.00 - 0.20 x1000 Final    [facility] Mono Count 07/08/2024 0.46  0.10 - 1.00 x1000 Final    [facility] Mono % 07/08/2024 7.4  % Final    [facility] Neutrophil % 07/08/2024 63.5  % Final    [facility] Neutrophil Count (ANC) 07/08/2024 3.92  1.40 - 8.80 x1000 Final    [facility] Lymph % 07/08/2024 26.3  % Final    [facility] Eos Count 07/08/2024 0.10  0.00 - 0.50 x1000 Final    [facility] Eosin % 07/08/2024 1.6  % Final    [facility] HGB A1C 07/08/2024 7.3  % Final    [facility] Microalbumin/Creatinine* 07/08/2024 52 (H)  0 - 30 mg MALB/gm CREAT Final    [facility] Microalbumin, Urine mg/L 07/08/2024 18.0  mg/L Final    [facility] Creatinine, Urine mg/dl 07/08/2024 34.9  mg/dL Final    [facility] TIBC 07/08/2024 306  250 - 425 ug/ml Final    [facility] IRON 07/08/2024 54  50 - 170 ug/dl Final    [facility] Saturation 07/08/2024 17.6 (L)  20.0 - 50.0 % Final    [facility] Magnesium 07/08/2024 1.8  1.6 - 2.6 mg/dL Final    [facility] FERRITIN 07/08/2024 101  10 - 291 ng/mL Final    [facility] Uric Acid 07/08/2024 4.9  3.1 - 7.8 mg/dL Final    [facility] Vitamin B12 07/08/2024 267  211 - 911 pg/mL Final    [facility] K 07/08/2024 4.0  3.5 - 5.1 mmol/L Final    [facility] GOT 07/08/2024 13 (L)  15 - 37 unit/L Final    [facility] Calcium 07/08/2024 9.7   8.7 - 10.4 mg/dL Final    [facility] BUN 07/08/2024 19  9 - 23 mg/dL Final    [facility] GFR AA 07/08/2024 >60   Final    [facility] Glucose 07/08/2024 85  74 - 106 mg/dL Final    [facility] Bilirubin, Total 07/08/2024 0.90  0.30 - 1.20 mg/dL Final    [facility] ALB 07/08/2024 3.7  3.4 - 5.0 g/dL Final    [facility] Chloride 07/08/2024 105  98 - 107 mmol/L Final    [facility] A/G Ratio 07/08/2024 1.2   Final    [facility] BUN/Creat Ratio 07/08/2024 23.8   Final    [facility] GPT 07/08/2024 13  10 - 49 unit/L Final    [facility] Na 07/08/2024 141  135 - 145 mmol/L Final    [facility] Creatinine 07/08/2024 0.8  0.5 - 0.8 mg/dL Final    [facility] Alk Phos 07/08/2024 46  45 - 117 unit/L Final    [facility] Total Protein 07/08/2024 6.9  5.7 - 8.2 g/dL Final    [facility] CO2, venous 07/08/2024 30.0  20.0 - 31.0 mmol/L Final    [facility] Calculated Osmolality 07/08/2024 283  275 - 295 mOsm/kg Final    [facility] Glomerular Filtration R* 07/08/2024 >60  mL/min/1.73m? Final    [facility] Globulin 07/08/2024 3.2  g/dL Final    [facility] TSH 07/08/2024 1.47  0.55 - 4.78 uIU/ml Final    [facility] Triglycerides 07/08/2024 99  30 - 150 mg/dL Final    [facility] Cholesterol 07/08/2024 137  100 - 200 mg/dL Final    [facility] Calculated LDL Choleste* 07/08/2024 74  60 - 130 mg/dL Final    [facility] HDL Cholesterol 07/08/2024 43  40 - 60 mg/dL Final    [facility] Total Chol/HDL Chol Rat* 07/08/2024 3.2   Final    [facility] Hepatitis C Antibody 07/08/2024 Nonreactive   Final    [facility] U MICRO 07/08/2024 Indicated   Final    [facility] Specific Gravity, U 07/08/2024 1.008  1.001 - 1.035 Final    [facility] Nitrite, U 07/08/2024 Negative  Negative Final    [facility] Bilirubin, U 07/08/2024 Negative  Negative Final    [facility] Protein, U 07/08/2024 Negative  Negative Final    [facility] Squamous Epithelial Indigo* 07/08/2024 <1  #/HPF Final    [facility] Appearance, U 07/08/2024 Clear   Final    [facility] RBC/HPF, U  07/08/2024 <1  0 - 3 #/HPF Final    [facility] Blood, U 07/08/2024 Negative  Negative Final    [facility] Leukocyte Esterase, U 07/08/2024 Trace (A)  Negative Final    [facility] Ketones, U 07/08/2024 Negative  Negative Final    [facility] Urobilinogen Qual, U 07/08/2024 <2.0 mg/dl  <2.0 mg/dl Final    [facility] Glucose Qual, U 07/08/2024 Negative  Negative Final    [facility] WBC/HPF, U 07/08/2024 1  0 - 5 #/HPF Final    [facility] Color, U 07/08/2024 Straw   Final    [facility] pH, U 07/08/2024 6.0  4.5 - 8.0 Final   Orders Only on 06/18/2024   Component Date Value Ref Range Status    Hemoglobin A1C External 02/23/2024 7.0  % Final       Radiology: Reviewed imaging in powerchart.  No results found.      Charting was completed using voice recognition technology and may include unintended errors.

## 2024-09-09 ENCOUNTER — TELEMEDICINE (OUTPATIENT)
Dept: PRIMARY CARE | Facility: CLINIC | Age: 69
End: 2024-09-09
Payer: MEDICARE

## 2024-09-09 VITALS — HEIGHT: 70 IN | WEIGHT: 221 LBS | BODY MASS INDEX: 31.64 KG/M2

## 2024-09-09 DIAGNOSIS — S29.9XXA INJURY OF CHEST WALL, INITIAL ENCOUNTER: ICD-10-CM

## 2024-09-09 DIAGNOSIS — I63.00 CEREBROVASCULAR ACCIDENT (CVA) DUE TO THROMBOSIS OF PRECEREBRAL ARTERY (MULTI): ICD-10-CM

## 2024-09-09 DIAGNOSIS — R05.9 COUGH, UNSPECIFIED TYPE: ICD-10-CM

## 2024-09-09 DIAGNOSIS — E11.49 DIABETES MELLITUS TYPE 2 WITH NEUROLOGICAL MANIFESTATIONS (MULTI): ICD-10-CM

## 2024-09-09 DIAGNOSIS — S29.9XXA SOFT TISSUE INJURY OF RIGHT CHEST WALL: Primary | ICD-10-CM

## 2024-09-09 DIAGNOSIS — Z91.81 HISTORY OF FALL: ICD-10-CM

## 2024-09-09 DIAGNOSIS — I48.20 CHRONIC ATRIAL FIBRILLATION (MULTI): ICD-10-CM

## 2024-09-09 PROCEDURE — 1160F RVW MEDS BY RX/DR IN RCRD: CPT | Performed by: INTERNAL MEDICINE

## 2024-09-09 PROCEDURE — 99213 OFFICE O/P EST LOW 20 MIN: CPT | Performed by: INTERNAL MEDICINE

## 2024-09-09 PROCEDURE — 1036F TOBACCO NON-USER: CPT | Performed by: INTERNAL MEDICINE

## 2024-09-09 PROCEDURE — 3051F HG A1C>EQUAL 7.0%<8.0%: CPT | Performed by: INTERNAL MEDICINE

## 2024-09-09 PROCEDURE — 2028F FOOT EXAM PERFORMED: CPT | Performed by: INTERNAL MEDICINE

## 2024-09-09 PROCEDURE — 1159F MED LIST DOCD IN RCRD: CPT | Performed by: INTERNAL MEDICINE

## 2024-09-09 PROCEDURE — 3008F BODY MASS INDEX DOCD: CPT | Performed by: INTERNAL MEDICINE

## 2024-09-09 PROCEDURE — 1123F ACP DISCUSS/DSCN MKR DOCD: CPT | Performed by: INTERNAL MEDICINE

## 2024-09-09 PROCEDURE — 4010F ACE/ARB THERAPY RXD/TAKEN: CPT | Performed by: INTERNAL MEDICINE

## 2024-09-09 RX ORDER — TIZANIDINE 4 MG/1
4 TABLET ORAL EVERY 12 HOURS PRN
Qty: 10 TABLET | Refills: 0 | Status: SHIPPED | OUTPATIENT
Start: 2024-09-09

## 2024-09-09 RX ORDER — KETOROLAC TROMETHAMINE 10 MG/1
10 TABLET, FILM COATED ORAL EVERY 12 HOURS PRN
Qty: 10 TABLET | Refills: 0 | Status: SHIPPED | OUTPATIENT
Start: 2024-09-09

## 2024-09-09 NOTE — ASSESSMENT & PLAN NOTE
Accidental fall with a chest injury scapular border injury advised to get a chest x-ray PA and lateral x-ray of the ribs given Toradol 10 mg every 12 as needed #10 take vitamin C vitamin D ibuprofen acetaminophen ice therapy follow-up if no better go to emergency room

## 2024-09-09 NOTE — PROGRESS NOTES
"Subjective   Patient ID: Viki Boykin \"Deon" is a 68 y.o. female who presents for Fall (Sept 1st hurt /), Flank Pain (Right side ), and Shoulder Pain (Right ).    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)     Monitor blood sugar get therapy         History of fall     Physical therapy         Injury of chest wall - Primary     Accidental fall with a chest injury scapular border injury advised to get a chest x-ray PA and lateral x-ray of the ribs given Toradol 10 mg every 12 as needed #10 take vitamin C vitamin D ibuprofen acetaminophen ice therapy follow-up if no better go to emergency room         Relevant Orders    XR ribs right 2 views    Cough    Relevant Orders    XR chest 2 views     Patient was evaluated today, problem list was reviewed, problems and concerns addressed, Rx list reviewed and updated, lab and tests were noted and reviewed. Life style changes were discussed, always it works better if we eat plant based diet and plenty of fibres and roughage. Consume adequate amount of water and avoid alcohol, light to moderate physical activities and stress reduction are always beneficial for ongoing physical well being. Do not forget to have 6 to 7 hours of sleep regularly and avoid late night artur screen exposure.    HPI 68-year-old patient diabetes hypertension hyperlipidemia CVA chronic atrial fibrillation accidental fall September 1 at home injury to the right side of the shoulder scapular chest aggravated by stroke patient complained of a cough congestions rib pain scapular pain shoulder pain onset acutely duration few days progressive acutely aggravating factor history of the fall with osteopenia " osteoarthritis    Negative for hypoxia    Negative for referral pain to the head or spine    Negative for loss of consciousness    Negative for seizure activity    Negative for bleeding or hypoxia hypoglycemia hypertension under all suicide      Past Medical History:   Diagnosis Date   • Abscess of vulva 06/22/2020    Boil, labium   • Body mass index (BMI) 31.0-31.9, adult 06/30/2022    BMI 31.0-31.9,adult   • Candidiasis of skin and nail 12/05/2016    Skin yeast infection   • Candidiasis of skin and nail     Cutaneous candidiasis   • Candidiasis, unspecified 06/19/2020    Yeast infection   • Contact with and (suspected) exposure to covid-19 02/08/2021    Suspected COVID-19 virus infection   • Encounter for screening for malignant neoplasm of colon 06/27/2022    Screen for colon cancer   • Encounter for screening for malignant neoplasm of rectum 06/27/2022    Screening for rectal cancer   • Essential (primary) hypertension 07/17/2020    Benign essential hypertension   • Fracture of orbit, unspecified, initial encounter for closed fracture (Multi) 05/25/2021    Right orbital fracture   • History of falling 12/02/2021    History of fall   • Influenza due to unidentified influenza virus with other respiratory manifestations 03/05/2018    Bronchitis with flu   • Osteoarthritis of knee, unspecified     Knee osteoarthritis   • Overweight 06/30/2022    Overweight with body mass index (BMI) of 28 to 28.9 in adult   • Pain in left shoulder 06/19/2020    Left shoulder pain   • Personal history of other diseases of the circulatory system 12/02/2021    History of peripheral arterial disease   • Personal history of other diseases of the digestive system 05/15/2019    History of constipation   • Personal history of other diseases of the female genital tract 10/19/2017    History of vaginitis   • Personal history of other diseases of the musculoskeletal system and connective tissue     Personal history of osteoporosis   • Personal  history of other diseases of the musculoskeletal system and connective tissue 2019    History of muscle spasm   • Personal history of other endocrine, nutritional and metabolic disease 2022    History of vitamin D deficiency   • Personal history of other endocrine, nutritional and metabolic disease 2021    History of hypothyroidism   • Personal history of other endocrine, nutritional and metabolic disease 2020    History of morbid obesity   • Personal history of other endocrine, nutritional and metabolic disease 2020    History of hyperlipidemia   • Personal history of other endocrine, nutritional and metabolic disease 2020    History of hypokalemia   • Personal history of other infectious and parasitic diseases     History of mumps   • Personal history of other infectious and parasitic diseases     History of measles   • Personal history of other infectious and parasitic diseases     History of varicella   • Personal history of pneumonia (recurrent)     History of pneumonia   • Personal history of traumatic brain injury 2021    History of closed head injury   • Personal history of urinary (tract) infections 2021    History of urinary tract infection   • Type 2 diabetes mellitus with unspecified complications (Multi) 2017    Type 2 diabetes mellitus with complication, unspecified long term insulin use status   • Unspecified dislocation of left little finger, initial encounter     Dislocation of fifth finger, left, closed     Past Surgical History:   Procedure Laterality Date   •  SECTION, CLASSIC  2014     Section   • SHOULDER SURGERY  2014    Shoulder Surgery   • TONSILLECTOMY  2014    Tonsillectomy With Adenoidectomy     Allergies   Allergen Reactions   • Cat Dander Swelling   • Penicillins Other     PCN, did not work on patient.   • Tree And Shrub Pollen Hives     Current Outpatient Medications   Medication Sig Dispense Refill  "  • amLODIPine (Norvasc) 5 mg tablet TAKE 1 TABLET BY MOUTH TWICE  DAILY 180 tablet 3   • calcium carbonate-vitamin D3 600 mg-5 mcg (200 unit) tablet Take by mouth once daily.     • Eliquis 5 mg tablet TAKE 1 TABLET BY MOUTH TWICE  DAILY 180 tablet 3   • flecainide (Tambocor) 100 mg tablet TAKE 1 TABLET BY MOUTH EVERY 12  HOURS 180 tablet 3   • furosemide (Lasix) 20 mg tablet TAKE 1 TABLET BY MOUTH DAILY 90 tablet 3   • insulin degludec (Tresiba FlexTouch U-200) 200 unit/mL (3 mL) injection INJECT SUBCUTANEOUSLY 58 UNITS  ONCE DAILY 27 mL 3   • lisinopril 20 mg tablet Take 1 tablet (20 mg) by mouth once daily. 90 tablet 3   • metFORMIN (Glucophage) 500 mg tablet TAKE 1 TABLET BY MOUTH 3 TIMES  DAILY 270 tablet 3   • metoprolol tartrate (Lopressor) 25 mg tablet Take 1 tablet by mouth 2 times a day. 180 tablet 3   • pen needle, diabetic (BD Ultra-Fine Dulce Pen Needle) 32 gauge x 5/32\" needle INJECTS ONCE A  each 3   • potassium chloride CR 20 mEq ER tablet TAKE 1 TABLET BY MOUTH DAILY 90 tablet 3   • rosuvastatin (Crestor) 10 mg tablet TAKE 1 TABLET BY MOUTH DAILY 90 tablet 3   • zinc acetate 50 mg (zinc) capsule 50 mg.       No current facility-administered medications for this visit.     Family History   Problem Relation Name Age of Onset   • Hypertension Mother     • Diabetes Mother     • Other (fibroid tumors) Mother     • Diabetes Father     • Heart disease Father       Social History     Socioeconomic History   • Marital status:    Tobacco Use   • Smoking status: Never   • Smokeless tobacco: Never   Substance and Sexual Activity   • Alcohol use: Yes     Comment: rare   • Drug use: Never     Immunization History   Administered Date(s) Administered   • Flu vaccine (IIV4), preservative free *Check age/dose* 01/09/2018, 09/24/2021   • Flu vaccine, quadrivalent, high-dose, preservative free, age 65y+ (FLUZONE) 10/12/2023   • Flu vaccine, trivalent, preservative free, age 6 months and greater " "(Fluarix/Fluzone/Flulaval) 12/01/2015, 09/12/2016   • Influenza, Unspecified 11/16/2009, 10/12/2010, 11/12/2012, 08/29/2013, 09/22/2014, 12/01/2015, 09/12/2016, 09/07/2018, 09/09/2019, 10/13/2020, 10/18/2022   • Influenza, seasonal, injectable 11/16/2009, 10/12/2010, 11/12/2012, 08/29/2013, 09/22/2014, 12/01/2015, 09/12/2016, 09/07/2018, 10/13/2020   • Novel influenza-H1N1-09, preservative-free 01/13/2010   • Pfizer Gray Cap SARS-CoV-2 04/30/2022   • Pfizer Purple Cap SARS-CoV-2 03/30/2021, 04/20/2021   • Pneumococcal conjugate vaccine, 20-valent (PREVNAR 20) 02/13/2023   • Pneumococcal polysaccharide vaccine, 23-valent, age 2 years and older (PNEUMOVAX 23) 06/19/2020   • RSV, 60 Years And Older (AREXVY) 01/03/2024   • SARS-CoV-2, Unspecified 04/30/2022   • Td vaccine, age 7 years and older (TDVAX) 11/20/2007   • Tdap vaccine, age 7 year and older (BOOSTRIX, ADACEL) 05/23/2021   • Tetanus toxoid, adsorbed 11/20/2007   • Zoster vaccine, recombinant, adult (SHINGRIX) 01/18/2024, 04/19/2024       Review of Systems  Review of systems is otherwise negative unless stated above or in history of present illness.    Objective   Visit Vitals  Ht 1.778 m (5' 10\")   Wt 100 kg (221 lb)   BMI 31.71 kg/m²   Smoking Status Never   BSA 2.22 m²     Physical Exam  .Doxy  This visit was completed via video audio relation to  covid 19 pandemic all issues as below that discuss and address but no physical exam was performed if it was felt that patient should be evaluated in clinic and they have been advised to follow . Patient verbally consented to visit and spent  more than 50% discuss about patient's complaint of problem and plan        Orders Only on 07/08/2024   Component Date Value Ref Range Status   • [facility] Nucleated RBC 07/08/2024 0  /100WBC Final   • [facility] HCT 07/08/2024 40.9  36.0 - 46.0 % Final   • [facility] Platelet 07/08/2024 278  150 - 450 x10 Final   • [facility] RBC 07/08/2024 4.97  4.20 - 5.40 x10 Final   • " [facility] Instr WBC 07/08/2024 6.2   Final   • [facility] MCHC 07/08/2024 34.2  32.0 - 37.0 g/dL Final   • [facility] MCV 07/08/2024 82.3  80.0 - 100.0 fL Final   • [facility] HGB 07/08/2024 14.0  12.0 - 16.0 g/dL Final   • [facility] MPV 07/08/2024 7.9  7.4 - 10.4 fL Final   • [facility] RDW 07/08/2024 13.8  11.5 - 14.5 % Final   • [facility] WBC 07/08/2024 6.2  4.5 - 11.0 x10 Final   • [facility] MCH 07/08/2024 28.1  27.0 - 34.0 pg Final   • [facility] DIFF? 07/08/2024 No   Final   • [facility] Lymph Count 07/08/2024 1.63  1.20 - 4.80 x1000 Final   • [facility] Basos % 07/08/2024 1.2  % Final   • [facility] Baso Count 07/08/2024 0.07  0.00 - 0.20 x1000 Final   • [facility] Mono Count 07/08/2024 0.46  0.10 - 1.00 x1000 Final   • [facility] Mono % 07/08/2024 7.4  % Final   • [facility] Neutrophil % 07/08/2024 63.5  % Final   • [facility] Neutrophil Count (ANC) 07/08/2024 3.92  1.40 - 8.80 x1000 Final   • [facility] Lymph % 07/08/2024 26.3  % Final   • [facility] Eos Count 07/08/2024 0.10  0.00 - 0.50 x1000 Final   • [facility] Eosin % 07/08/2024 1.6  % Final   • [facility] HGB A1C 07/08/2024 7.3  % Final   • [facility] Microalbumin/Creatinine* 07/08/2024 52 (H)  0 - 30 mg MALB/gm CREAT Final   • [facility] Microalbumin, Urine mg/L 07/08/2024 18.0  mg/L Final   • [facility] Creatinine, Urine mg/dl 07/08/2024 34.9  mg/dL Final   • [facility] TIBC 07/08/2024 306  250 - 425 ug/ml Final   • [facility] IRON 07/08/2024 54  50 - 170 ug/dl Final   • [facility] Saturation 07/08/2024 17.6 (L)  20.0 - 50.0 % Final   • [facility] Magnesium 07/08/2024 1.8  1.6 - 2.6 mg/dL Final   • [facility] FERRITIN 07/08/2024 101  10 - 291 ng/mL Final   • [facility] Uric Acid 07/08/2024 4.9  3.1 - 7.8 mg/dL Final   • [facility] Vitamin B12 07/08/2024 267  211 - 911 pg/mL Final   • [facility] K 07/08/2024 4.0  3.5 - 5.1 mmol/L Final   • [facility] GOT 07/08/2024 13 (L)  15 - 37 unit/L Final   • [facility] Calcium 07/08/2024 9.7  8.7 - 10.4  mg/dL Final   • [facility] BUN 07/08/2024 19  9 - 23 mg/dL Final   • [facility] GFR AA 07/08/2024 >60   Final   • [facility] Glucose 07/08/2024 85  74 - 106 mg/dL Final   • [facility] Bilirubin, Total 07/08/2024 0.90  0.30 - 1.20 mg/dL Final   • [facility] ALB 07/08/2024 3.7  3.4 - 5.0 g/dL Final   • [facility] Chloride 07/08/2024 105  98 - 107 mmol/L Final   • [facility] A/G Ratio 07/08/2024 1.2   Final   • [facility] BUN/Creat Ratio 07/08/2024 23.8   Final   • [facility] GPT 07/08/2024 13  10 - 49 unit/L Final   • [facility] Na 07/08/2024 141  135 - 145 mmol/L Final   • [facility] Creatinine 07/08/2024 0.8  0.5 - 0.8 mg/dL Final   • [facility] Alk Phos 07/08/2024 46  45 - 117 unit/L Final   • [facility] Total Protein 07/08/2024 6.9  5.7 - 8.2 g/dL Final   • [facility] CO2, venous 07/08/2024 30.0  20.0 - 31.0 mmol/L Final   • [facility] Calculated Osmolality 07/08/2024 283  275 - 295 mOsm/kg Final   • [facility] Glomerular Filtration R* 07/08/2024 >60  mL/min/1.73m? Final   • [facility] Globulin 07/08/2024 3.2  g/dL Final   • [facility] TSH 07/08/2024 1.47  0.55 - 4.78 uIU/ml Final   • [facility] Triglycerides 07/08/2024 99  30 - 150 mg/dL Final   • [facility] Cholesterol 07/08/2024 137  100 - 200 mg/dL Final   • [facility] Calculated LDL Choleste* 07/08/2024 74  60 - 130 mg/dL Final   • [facility] HDL Cholesterol 07/08/2024 43  40 - 60 mg/dL Final   • [facility] Total Chol/HDL Chol Rat* 07/08/2024 3.2   Final   • [facility] Hepatitis C Antibody 07/08/2024 Nonreactive   Final   • [facility] U MICRO 07/08/2024 Indicated   Final   • [facility] Specific Gravity, U 07/08/2024 1.008  1.001 - 1.035 Final   • [facility] Nitrite, U 07/08/2024 Negative  Negative Final   • [facility] Bilirubin, U 07/08/2024 Negative  Negative Final   • [facility] Protein, U 07/08/2024 Negative  Negative Final   • [facility] Squamous Epithelial Indigo* 07/08/2024 <1  #/HPF Final   • [facility] Appearance, U 07/08/2024 Clear   Final   • [facility] RBC/HPF, U 07/08/2024 <1  0 - 3 #/HPF Final   • [facility] Blood, U 07/08/2024 Negative  Negative Final   • [facility] Leukocyte Esterase, U 07/08/2024 Trace (A)  Negative Final   • [facility] Ketones, U 07/08/2024 Negative  Negative Final   • [facility] Urobilinogen Qual, U 07/08/2024 <2.0 mg/dl  <2.0 mg/dl Final   • [facility] Glucose Qual, U 07/08/2024 Negative  Negative Final   • [facility] WBC/HPF, U 07/08/2024 1  0 - 5 #/HPF Final   • [facility] Color, U 07/08/2024 Straw   Final   • [facility] pH, U 07/08/2024 6.0  4.5 - 8.0 Final   Orders Only on 06/18/2024   Component Date Value Ref Range Status   • Hemoglobin A1C External 02/23/2024 7.0  % Final       Radiology: Reviewed imaging in powerchart.  No results found.      Charting was completed using voice recognition technology and may include unintended errors.

## 2024-09-10 ENCOUNTER — TELEPHONE (OUTPATIENT)
Dept: PRIMARY CARE | Facility: CLINIC | Age: 69
End: 2024-09-10
Payer: MEDICARE

## 2024-10-23 ENCOUNTER — TELEPHONE (OUTPATIENT)
Dept: PRIMARY CARE | Facility: CLINIC | Age: 69
End: 2024-10-23
Payer: MEDICARE

## 2024-10-24 ENCOUNTER — TELEMEDICINE (OUTPATIENT)
Dept: PRIMARY CARE | Facility: CLINIC | Age: 69
End: 2024-10-24
Payer: MEDICARE

## 2024-10-24 VITALS — BODY MASS INDEX: 31.92 KG/M2 | WEIGHT: 223 LBS | HEIGHT: 70 IN | TEMPERATURE: 98.1 F

## 2024-10-24 DIAGNOSIS — I48.20 CHRONIC ATRIAL FIBRILLATION (MULTI): ICD-10-CM

## 2024-10-24 DIAGNOSIS — Z12.31 ENCOUNTER FOR SCREENING MAMMOGRAM FOR MALIGNANT NEOPLASM OF BREAST: ICD-10-CM

## 2024-10-24 DIAGNOSIS — N02.0 IDIOPATHIC HEMATURIA WITH MINOR GLOMERULAR ABNORMALITY: ICD-10-CM

## 2024-10-24 DIAGNOSIS — I10 DIABETES MELLITUS WITH COINCIDENT HYPERTENSION (MULTI): ICD-10-CM

## 2024-10-24 DIAGNOSIS — E11.69 DM TYPE 2 WITH DIABETIC MIXED HYPERLIPIDEMIA (MULTI): ICD-10-CM

## 2024-10-24 DIAGNOSIS — E11.9 DIABETES MELLITUS WITH COINCIDENT HYPERTENSION (MULTI): ICD-10-CM

## 2024-10-24 DIAGNOSIS — N39.0 UTI (URINARY TRACT INFECTION), UNCOMPLICATED: Primary | ICD-10-CM

## 2024-10-24 DIAGNOSIS — E78.2 DM TYPE 2 WITH DIABETIC MIXED HYPERLIPIDEMIA (MULTI): ICD-10-CM

## 2024-10-24 PROBLEM — Z91.81 HISTORY OF FALL: Status: RESOLVED | Noted: 2024-09-09 | Resolved: 2024-10-24

## 2024-10-24 PROBLEM — R05.9 COUGH: Status: RESOLVED | Noted: 2024-09-09 | Resolved: 2024-10-24

## 2024-10-24 PROBLEM — S29.9XXA INJURY OF CHEST WALL: Status: RESOLVED | Noted: 2024-09-09 | Resolved: 2024-10-24

## 2024-10-24 PROCEDURE — 1159F MED LIST DOCD IN RCRD: CPT | Performed by: INTERNAL MEDICINE

## 2024-10-24 PROCEDURE — 1123F ACP DISCUSS/DSCN MKR DOCD: CPT | Performed by: INTERNAL MEDICINE

## 2024-10-24 PROCEDURE — 4010F ACE/ARB THERAPY RXD/TAKEN: CPT | Performed by: INTERNAL MEDICINE

## 2024-10-24 PROCEDURE — 3051F HG A1C>EQUAL 7.0%<8.0%: CPT | Performed by: INTERNAL MEDICINE

## 2024-10-24 PROCEDURE — 1036F TOBACCO NON-USER: CPT | Performed by: INTERNAL MEDICINE

## 2024-10-24 PROCEDURE — 2028F FOOT EXAM PERFORMED: CPT | Performed by: INTERNAL MEDICINE

## 2024-10-24 PROCEDURE — 3008F BODY MASS INDEX DOCD: CPT | Performed by: INTERNAL MEDICINE

## 2024-10-24 PROCEDURE — G2211 COMPLEX E/M VISIT ADD ON: HCPCS | Performed by: INTERNAL MEDICINE

## 2024-10-24 PROCEDURE — 99213 OFFICE O/P EST LOW 20 MIN: CPT | Performed by: INTERNAL MEDICINE

## 2024-10-24 PROCEDURE — 1160F RVW MEDS BY RX/DR IN RCRD: CPT | Performed by: INTERNAL MEDICINE

## 2024-10-24 RX ORDER — CEPHALEXIN 500 MG/1
500 CAPSULE ORAL 2 TIMES DAILY
Qty: 14 CAPSULE | Refills: 0 | Status: CANCELLED | OUTPATIENT
Start: 2024-10-24 | End: 2024-10-31

## 2024-10-24 RX ORDER — CIPROFLOXACIN 500 MG/1
500 TABLET ORAL 2 TIMES DAILY
Qty: 10 TABLET | Refills: 0 | Status: SHIPPED | OUTPATIENT
Start: 2024-10-24 | End: 2024-10-29

## 2024-10-24 ASSESSMENT — PATIENT HEALTH QUESTIONNAIRE - PHQ9
1. LITTLE INTEREST OR PLEASURE IN DOING THINGS: NOT AT ALL
2. FEELING DOWN, DEPRESSED OR HOPELESS: NOT AT ALL
SUM OF ALL RESPONSES TO PHQ9 QUESTIONS 1 AND 2: 0

## 2024-10-24 NOTE — PROGRESS NOTES
"Subjective   Patient ID: Viki Boykin \"Deon" is a 68 y.o. female who presents for UTI (1 week now/Burning and frequency /Blood in the urine that started yesterday but better today- lasted about 12 hours ).    Assessment/Plan     Problem List Items Addressed This Visit       Chronic atrial fibrillation (Multi)    Encounter for screening mammogram for malignant neoplasm of breast    Relevant Orders    BI mammo bilateral screening tomosynthesis    Diabetes mellitus with coincident hypertension (Multi)     Patients BP readings reviewed and addressed, as we age our arteries turn stiffer and less elastic. Restricting salt consumption and staying physically fit with regular exercise regimen is the only way to keep our vasculature less tonic. Studies have shown that keeping ideal body wt, exercise routine about 140 to 150 minutes a week, eating variety of plant based diet and drinking plentiful water are quite helpful. Monitor BP twice or once a week at home and bring log to be reviewed by me. Uncontrolled BP has long term consequences including heart failure, myocardial infarction, accelerated atherosclerosis and kidney dysfunction. Therapy reviewed and explained.           Relevant Orders    Lipid panel    Albumin-Creatinine Ratio, Urine Random    BI mammo bilateral screening tomosynthesis    Urine Culture    CBC and Auto Differential    Urinalysis with Reflex Microscopic    DM type 2 with diabetic mixed hyperlipidemia (Multi)     Monitor blood sugar closely eating more water because of the infection         UTI (urinary tract infection), uncomplicated - Primary    Relevant Orders    Urine Culture    Urinalysis with Reflex Microscopic    Idiopathic hematuria with minor glomerular abnormality     Patient was evaluated today, problem list was reviewed, problems and concerns addressed, Rx list reviewed and updated, lab and tests were noted and reviewed. Life style changes were discussed, always it works better if we " eat plant based diet and plenty of fibres and roughage. Consume adequate amount of water and avoid alcohol, light to moderate physical activities and stress reduction are always beneficial for ongoing physical well being. Do not forget to have 6 to 7 hours of sleep regularly and avoid late night artur screen exposure.    HPI 68-year-old patient  with children history of chronic kidney disease chronic heart disease chronic A-fib stroke survival complaining urgency frequency burning onset 36 hours progressed acutely aggravating factor diabetes with immunocompromise host developing urgency frequency burning allergy to penicillin developed hematuria discussed with the patient possible multiple relation of the infections and Eliquis monitor the blood sugar blood pressure very closely and watch for the any bleeding further check the CBC BMP UA CNS given patient's given ciprofloxacin 5 to twice a day watch for the tendinitis C. difficile colitis and QTc interval    Patient requested Keflex allergy to penicillin Keflex was not given given patient Cipro floxacillin instead    Monitor the blood pressure blood sugar very closely monitor hematuria closely if no better give me a call go to ER    Past Medical History:   Diagnosis Date    Abscess of vulva 06/22/2020    Boil, labium    Body mass index (BMI) 31.0-31.9, adult 06/30/2022    BMI 31.0-31.9,adult    Candidiasis of skin and nail 12/05/2016    Skin yeast infection    Candidiasis of skin and nail     Cutaneous candidiasis    Candidiasis, unspecified 06/19/2020    Yeast infection    Contact with and (suspected) exposure to covid-19 02/08/2021    Suspected COVID-19 virus infection    Encounter for screening for malignant neoplasm of colon 06/27/2022    Screen for colon cancer    Encounter for screening for malignant neoplasm of rectum 06/27/2022    Screening for rectal cancer    Essential (primary) hypertension 07/17/2020    Benign essential hypertension    Fracture of  orbit, unspecified, initial encounter for closed fracture (Multi) 05/25/2021    Right orbital fracture    History of falling 12/02/2021    History of fall    Influenza due to unidentified influenza virus with other respiratory manifestations 03/05/2018    Bronchitis with flu    Osteoarthritis of knee, unspecified     Knee osteoarthritis    Overweight 06/30/2022    Overweight with body mass index (BMI) of 28 to 28.9 in adult    Pain in left shoulder 06/19/2020    Left shoulder pain    Personal history of other diseases of the circulatory system 12/02/2021    History of peripheral arterial disease    Personal history of other diseases of the digestive system 05/15/2019    History of constipation    Personal history of other diseases of the female genital tract 10/19/2017    History of vaginitis    Personal history of other diseases of the musculoskeletal system and connective tissue     Personal history of osteoporosis    Personal history of other diseases of the musculoskeletal system and connective tissue 11/11/2019    History of muscle spasm    Personal history of other endocrine, nutritional and metabolic disease 11/04/2022    History of vitamin D deficiency    Personal history of other endocrine, nutritional and metabolic disease 12/02/2021    History of hypothyroidism    Personal history of other endocrine, nutritional and metabolic disease 06/19/2020    History of morbid obesity    Personal history of other endocrine, nutritional and metabolic disease 06/19/2020    History of hyperlipidemia    Personal history of other endocrine, nutritional and metabolic disease 02/18/2020    History of hypokalemia    Personal history of other infectious and parasitic diseases     History of mumps    Personal history of other infectious and parasitic diseases     History of measles    Personal history of other infectious and parasitic diseases     History of varicella    Personal history of pneumonia (recurrent)     History of  "pneumonia    Personal history of traumatic brain injury 2021    History of closed head injury    Personal history of urinary (tract) infections 2021    History of urinary tract infection    Type 2 diabetes mellitus with unspecified complications 2017    Type 2 diabetes mellitus with complication, unspecified long term insulin use status    Unspecified dislocation of left little finger, initial encounter     Dislocation of fifth finger, left, closed     Past Surgical History:   Procedure Laterality Date     SECTION, CLASSIC  2014     Section    SHOULDER SURGERY  2014    Shoulder Surgery    TONSILLECTOMY  2014    Tonsillectomy With Adenoidectomy     Allergies   Allergen Reactions    Cat Dander Swelling    Penicillins Other     PCN, did not work on patient.    Tree [place] Shrub Pollen Hives     Current Outpatient Medications   Medication Sig Dispense Refill    amLODIPine (Norvasc) 5 mg tablet TAKE 1 TABLET BY MOUTH TWICE  DAILY 180 tablet 3    calcium carbonate-vitamin D3 600 mg-5 mcg (200 unit) tablet Take by mouth once daily.      Eliquis 5 mg tablet TAKE 1 TABLET BY MOUTH TWICE  DAILY 180 tablet 3    flecainide (Tambocor) 100 mg tablet TAKE 1 TABLET BY MOUTH EVERY 12  HOURS 180 tablet 3    furosemide (Lasix) 20 mg tablet TAKE 1 TABLET BY MOUTH DAILY 90 tablet 3    insulin degludec (Tresiba FlexTouch U-200) 200 unit/mL (3 mL) injection INJECT SUBCUTANEOUSLY 58 UNITS  ONCE DAILY 27 mL 3    ketorolac (Toradol) 10 mg tablet Take 1 tablet (10 mg) by mouth every 12 hours if needed for moderate pain (4 - 6). 10 tablet 0    lisinopril 20 mg tablet Take 1 tablet (20 mg) by mouth once daily. 90 tablet 3    metFORMIN (Glucophage) 500 mg tablet TAKE 1 TABLET BY MOUTH 3 TIMES  DAILY 270 tablet 3    metoprolol tartrate (Lopressor) 25 mg tablet Take 1 tablet by mouth 2 times a day. 180 tablet 3    pen needle, diabetic (BD Ultra-Fine Dulce Pen Needle) 32 gauge x 32\" needle INJECTS " ONCE A  each 3    potassium chloride CR 20 mEq ER tablet TAKE 1 TABLET BY MOUTH DAILY 90 tablet 3    rosuvastatin (Crestor) 10 mg tablet TAKE 1 TABLET BY MOUTH DAILY 90 tablet 3    tiZANidine (Zanaflex) 4 mg tablet Take 1 tablet (4 mg) by mouth every 12 hours if needed for muscle spasms. 10 tablet 0    zinc acetate 50 mg (zinc) capsule 50 mg.       No current facility-administered medications for this visit.     Family History   Problem Relation Name Age of Onset    Hypertension Mother      Diabetes Mother      Other (fibroid tumors) Mother      Diabetes Father      Heart disease Father       Social History     Socioeconomic History    Marital status:    Tobacco Use    Smoking status: Never    Smokeless tobacco: Never   Substance and Sexual Activity    Alcohol use: Yes     Comment: rare    Drug use: Never     Immunization History   Administered Date(s) Administered    Flu vaccine (IIV4), preservative free *Check age/dose* 01/09/2018, 09/24/2021    Flu vaccine, quadrivalent, high-dose, preservative free, age 65y+ (FLUZONE) 10/12/2023    Flu vaccine, trivalent, preservative free, age 6 months and greater (Fluarix/Fluzone/Flulaval) 12/01/2015, 09/12/2016    Influenza, Unspecified 11/16/2009, 10/12/2010, 11/12/2012, 08/29/2013, 09/22/2014, 12/01/2015, 09/12/2016, 09/07/2018, 09/09/2019, 10/13/2020, 10/18/2022    Influenza, seasonal, injectable 11/16/2009, 10/12/2010, 11/12/2012, 08/29/2013, 09/22/2014, 12/01/2015, 09/12/2016, 09/07/2018, 10/13/2020    Novel influenza-H1N1-09, preservative-free 01/13/2010    Pfizer Gray Cap SARS-CoV-2 04/30/2022    Pfizer Purple Cap SARS-CoV-2 03/30/2021, 04/20/2021    Pneumococcal conjugate vaccine, 20-valent (PREVNAR 20) 02/13/2023    Pneumococcal polysaccharide vaccine, 23-valent, age 2 years and older (PNEUMOVAX 23) 06/19/2020    RSV, 60 Years And Older (AREXVY) 01/03/2024    SARS-CoV-2, Unspecified 04/30/2022    Td vaccine, age 7 years and older (TDVAX) 11/20/2007     "Tdap vaccine, age 7 year and older (BOOSTRIX, ADACEL) 05/23/2021    Tetanus toxoid, adsorbed 11/20/2007    Zoster vaccine, recombinant, adult (SHINGRIX) 01/18/2024, 04/19/2024       Review of Systems  Review of systems is otherwise negative unless stated above or in history of present illness.    Objective   Visit Vitals  Temp 36.7 °C (98.1 °F)   Ht 1.778 m (5' 10\")   Wt 101 kg (223 lb)   BMI 32.00 kg/m²   Smoking Status Never   BSA 2.23 m²     Physical Exam  .Doxy  This visit was completed via video audio relation to  covid 19 pandemic all issues as below that discuss and address but no physical exam was performed if it was felt that patient should be evaluated in clinic and they have been advised to follow . Patient verbally consented to visit and spent  more than 50% discuss about patient's complaint of problem and plan    Patient is not in distress    Orders Only on 07/08/2024   Component Date Value Ref Range Status    [facility] Nucleated RBC 07/08/2024 0  /100WBC Final    [facility] HCT 07/08/2024 40.9  36.0 - 46.0 % Final    [facility] Platelet 07/08/2024 278  150 - 450 x10 Final    [facility] RBC 07/08/2024 4.97  4.20 - 5.40 x10 Final    [facility] Instr WBC 07/08/2024 6.2   Final    [facility] MCHC 07/08/2024 34.2  32.0 - 37.0 g/dL Final    [facility] MCV 07/08/2024 82.3  80.0 - 100.0 fL Final    [facility] HGB 07/08/2024 14.0  12.0 - 16.0 g/dL Final    [facility] MPV 07/08/2024 7.9  7.4 - 10.4 fL Final    [facility] RDW 07/08/2024 13.8  11.5 - 14.5 % Final    [facility] WBC 07/08/2024 6.2  4.5 - 11.0 x10 Final    [facility] MCH 07/08/2024 28.1  27.0 - 34.0 pg Final    [facility] DIFF? 07/08/2024 No   Final    [facility] Lymph Count 07/08/2024 1.63  1.20 - 4.80 x1000 Final    [facility] Basos % 07/08/2024 1.2  % Final    [facility] Baso Count 07/08/2024 0.07  0.00 - 0.20 x1000 Final    [facility] Mono Count 07/08/2024 0.46  0.10 - 1.00 x1000 Final    [facility] Mono % 07/08/2024 7.4  % Final    [facility] " Neutrophil % 07/08/2024 63.5  % Final    [facility] Neutrophil Count (ANC) 07/08/2024 3.92  1.40 - 8.80 x1000 Final    [facility] Lymph % 07/08/2024 26.3  % Final    [facility] Eos Count 07/08/2024 0.10  0.00 - 0.50 x1000 Final    [facility] Eosin % 07/08/2024 1.6  % Final    [facility] HGB A1C 07/08/2024 7.3  % Final    [facility] Microalbumin/Creatinine* 07/08/2024 52 (H)  0 - 30 mg MALB/gm CREAT Final    [facility] Microalbumin, Urine mg/L 07/08/2024 18.0  mg/L Final    [facility] Creatinine, Urine mg/dl 07/08/2024 34.9  mg/dL Final    [facility] TIBC 07/08/2024 306  250 - 425 ug/ml Final    [facility] IRON 07/08/2024 54  50 - 170 ug/dl Final    [facility] Saturation 07/08/2024 17.6 (L)  20.0 - 50.0 % Final    [facility] Magnesium 07/08/2024 1.8  1.6 - 2.6 mg/dL Final    [facility] FERRITIN 07/08/2024 101  10 - 291 ng/mL Final    [facility] Uric Acid 07/08/2024 4.9  3.1 - 7.8 mg/dL Final    [facility] Vitamin B12 07/08/2024 267  211 - 911 pg/mL Final    [facility] K 07/08/2024 4.0  3.5 - 5.1 mmol/L Final    [facility] GOT 07/08/2024 13 (L)  15 - 37 unit/L Final    [facility] Calcium 07/08/2024 9.7  8.7 - 10.4 mg/dL Final    [facility] BUN 07/08/2024 19  9 - 23 mg/dL Final    [facility] GFR AA 07/08/2024 >60   Final    [facility] Glucose 07/08/2024 85  74 - 106 mg/dL Final    [facility] Bilirubin, Total 07/08/2024 0.90  0.30 - 1.20 mg/dL Final    [facility] ALB 07/08/2024 3.7  3.4 - 5.0 g/dL Final    [facility] Chloride 07/08/2024 105  98 - 107 mmol/L Final    [facility] A/G Ratio 07/08/2024 1.2   Final    [facility] BUN/Creat Ratio 07/08/2024 23.8   Final    [facility] GPT 07/08/2024 13  10 - 49 unit/L Final    [facility] Na 07/08/2024 141  135 - 145 mmol/L Final    [facility] Creatinine 07/08/2024 0.8  0.5 - 0.8 mg/dL Final    [facility] Alk Phos 07/08/2024 46  45 - 117 unit/L Final    [facility] Total Protein 07/08/2024 6.9  5.7 - 8.2 g/dL Final    [facility] CO2, venous 07/08/2024 30.0  20.0 - 31.0 mmol/L  Final    [facility] Calculated Osmolality 07/08/2024 283  275 - 295 mOsm/kg Final    [facility] Glomerular Filtration R* 07/08/2024 >60  mL/min/1.73m? Final    [facility] Globulin 07/08/2024 3.2  g/dL Final    [facility] TSH 07/08/2024 1.47  0.55 - 4.78 uIU/ml Final    [facility] Triglycerides 07/08/2024 99  30 - 150 mg/dL Final    [facility] Cholesterol 07/08/2024 137  100 - 200 mg/dL Final    [facility] Calculated LDL Choleste* 07/08/2024 74  60 - 130 mg/dL Final    [facility] HDL Cholesterol 07/08/2024 43  40 - 60 mg/dL Final    [facility] Total Chol/HDL Chol Rat* 07/08/2024 3.2   Final    [facility] Hepatitis C Antibody 07/08/2024 Nonreactive   Final    [facility] U MICRO 07/08/2024 Indicated   Final    [facility] Specific Gravity, U 07/08/2024 1.008  1.001 - 1.035 Final    [facility] Nitrite, U 07/08/2024 Negative  Negative Final    [facility] Bilirubin, U 07/08/2024 Negative  Negative Final    [facility] Protein, U 07/08/2024 Negative  Negative Final    [facility] Squamous Epithelial Indigo* 07/08/2024 <1  #/HPF Final    [facility] Appearance, U 07/08/2024 Clear   Final    [facility] RBC/HPF, U 07/08/2024 <1  0 - 3 #/HPF Final    [facility] Blood, U 07/08/2024 Negative  Negative Final    [facility] Leukocyte Esterase, U 07/08/2024 Trace (A)  Negative Final    [facility] Ketones, U 07/08/2024 Negative  Negative Final    [facility] Urobilinogen Qual, U 07/08/2024 <2.0 mg/dl  <2.0 mg/dl Final    [facility] Glucose Qual, U 07/08/2024 Negative  Negative Final    [facility] WBC/HPF, U 07/08/2024 1  0 - 5 #/HPF Final    [facility] Color, U 07/08/2024 Straw   Final    [facility] pH, U 07/08/2024 6.0  4.5 - 8.0 Final       Radiology: Reviewed imaging in powerchart.  No results found.      Charting was completed using voice recognition technology and may include unintended errors.

## 2024-11-11 ENCOUNTER — APPOINTMENT (OUTPATIENT)
Dept: PRIMARY CARE | Facility: CLINIC | Age: 69
End: 2024-11-11
Payer: MEDICARE

## 2024-11-11 VITALS
HEART RATE: 52 BPM | TEMPERATURE: 97.6 F | DIASTOLIC BLOOD PRESSURE: 70 MMHG | OXYGEN SATURATION: 99 % | SYSTOLIC BLOOD PRESSURE: 120 MMHG | HEIGHT: 70 IN | WEIGHT: 221.6 LBS | BODY MASS INDEX: 31.73 KG/M2

## 2024-11-11 DIAGNOSIS — E78.5 HYPERLIPIDEMIA, UNSPECIFIED HYPERLIPIDEMIA TYPE: ICD-10-CM

## 2024-11-11 DIAGNOSIS — E78.2 DM TYPE 2 WITH DIABETIC MIXED HYPERLIPIDEMIA (MULTI): ICD-10-CM

## 2024-11-11 DIAGNOSIS — I10 DIABETES MELLITUS WITH COINCIDENT HYPERTENSION (MULTI): ICD-10-CM

## 2024-11-11 DIAGNOSIS — Z23 NEED FOR INFLUENZA VACCINATION: ICD-10-CM

## 2024-11-11 DIAGNOSIS — E11.49 DIABETES MELLITUS TYPE 2 WITH NEUROLOGICAL MANIFESTATIONS (MULTI): ICD-10-CM

## 2024-11-11 DIAGNOSIS — R52 PAIN: ICD-10-CM

## 2024-11-11 DIAGNOSIS — M77.11 EPICONDYLITIS, LATERAL, RIGHT: Primary | ICD-10-CM

## 2024-11-11 DIAGNOSIS — E11.69 DM TYPE 2 WITH DIABETIC MIXED HYPERLIPIDEMIA (MULTI): ICD-10-CM

## 2024-11-11 DIAGNOSIS — I15.2 HYPERTENSION ASSOCIATED WITH DIABETES (MULTI): ICD-10-CM

## 2024-11-11 DIAGNOSIS — I10 HYPERTENSION, UNSPECIFIED TYPE: ICD-10-CM

## 2024-11-11 DIAGNOSIS — I63.89 CEREBROVASCULAR ACCIDENT (CVA) DUE TO OTHER MECHANISM: ICD-10-CM

## 2024-11-11 DIAGNOSIS — E11.59 HYPERTENSION ASSOCIATED WITH DIABETES (MULTI): ICD-10-CM

## 2024-11-11 DIAGNOSIS — Z79.4 TYPE 2 DIABETES MELLITUS WITHOUT COMPLICATION, WITH LONG-TERM CURRENT USE OF INSULIN (MULTI): ICD-10-CM

## 2024-11-11 DIAGNOSIS — I48.20 CHRONIC ATRIAL FIBRILLATION (MULTI): ICD-10-CM

## 2024-11-11 DIAGNOSIS — E11.9 DIABETES MELLITUS WITH COINCIDENT HYPERTENSION (MULTI): ICD-10-CM

## 2024-11-11 DIAGNOSIS — E11.9 TYPE 2 DIABETES MELLITUS WITHOUT COMPLICATION, WITH LONG-TERM CURRENT USE OF INSULIN (MULTI): ICD-10-CM

## 2024-11-11 PROCEDURE — 3074F SYST BP LT 130 MM HG: CPT | Performed by: INTERNAL MEDICINE

## 2024-11-11 PROCEDURE — 1159F MED LIST DOCD IN RCRD: CPT | Performed by: INTERNAL MEDICINE

## 2024-11-11 PROCEDURE — 1123F ACP DISCUSS/DSCN MKR DOCD: CPT | Performed by: INTERNAL MEDICINE

## 2024-11-11 PROCEDURE — G2211 COMPLEX E/M VISIT ADD ON: HCPCS | Performed by: INTERNAL MEDICINE

## 2024-11-11 PROCEDURE — 3008F BODY MASS INDEX DOCD: CPT | Performed by: INTERNAL MEDICINE

## 2024-11-11 PROCEDURE — G0008 ADMIN INFLUENZA VIRUS VAC: HCPCS | Performed by: INTERNAL MEDICINE

## 2024-11-11 PROCEDURE — 90662 IIV NO PRSV INCREASED AG IM: CPT | Performed by: INTERNAL MEDICINE

## 2024-11-11 PROCEDURE — 1160F RVW MEDS BY RX/DR IN RCRD: CPT | Performed by: INTERNAL MEDICINE

## 2024-11-11 PROCEDURE — 4010F ACE/ARB THERAPY RXD/TAKEN: CPT | Performed by: INTERNAL MEDICINE

## 2024-11-11 PROCEDURE — 2028F FOOT EXAM PERFORMED: CPT | Performed by: INTERNAL MEDICINE

## 2024-11-11 PROCEDURE — 1036F TOBACCO NON-USER: CPT | Performed by: INTERNAL MEDICINE

## 2024-11-11 PROCEDURE — 99214 OFFICE O/P EST MOD 30 MIN: CPT | Performed by: INTERNAL MEDICINE

## 2024-11-11 PROCEDURE — 3078F DIAST BP <80 MM HG: CPT | Performed by: INTERNAL MEDICINE

## 2024-11-11 PROCEDURE — 3051F HG A1C>EQUAL 7.0%<8.0%: CPT | Performed by: INTERNAL MEDICINE

## 2024-11-11 RX ORDER — INSULIN DEGLUDEC 200 U/ML
INJECTION, SOLUTION SUBCUTANEOUS
Qty: 27 ML | Refills: 3 | Status: SHIPPED | OUTPATIENT
Start: 2024-11-11

## 2024-11-11 RX ORDER — PEN NEEDLE, DIABETIC 30 GX3/16"
NEEDLE, DISPOSABLE MISCELLANEOUS
Qty: 100 EACH | Refills: 3 | Status: SHIPPED | OUTPATIENT
Start: 2024-11-11

## 2024-11-11 RX ORDER — AMLODIPINE BESYLATE 5 MG/1
5 TABLET ORAL 2 TIMES DAILY
Qty: 180 TABLET | Refills: 3 | Status: SHIPPED | OUTPATIENT
Start: 2024-11-11

## 2024-11-11 RX ORDER — FLECAINIDE ACETATE 100 MG/1
100 TABLET ORAL EVERY 12 HOURS
Qty: 180 TABLET | Refills: 3 | Status: SHIPPED | OUTPATIENT
Start: 2024-11-11

## 2024-11-11 RX ORDER — METFORMIN HYDROCHLORIDE 500 MG/1
500 TABLET ORAL 3 TIMES DAILY
Qty: 270 TABLET | Refills: 3 | Status: SHIPPED | OUTPATIENT
Start: 2024-11-11

## 2024-11-11 RX ORDER — FUROSEMIDE 20 MG/1
20 TABLET ORAL DAILY
Qty: 90 TABLET | Refills: 3 | Status: SHIPPED | OUTPATIENT
Start: 2024-11-11

## 2024-11-11 RX ORDER — TRIAMCINOLONE ACETONIDE 40 MG/ML
40 INJECTION, SUSPENSION INTRA-ARTICULAR; INTRAMUSCULAR ONCE
Status: COMPLETED | OUTPATIENT
Start: 2024-11-11 | End: 2024-11-11

## 2024-11-11 RX ORDER — ROSUVASTATIN CALCIUM 10 MG/1
10 TABLET, COATED ORAL DAILY
Qty: 90 TABLET | Refills: 3 | Status: SHIPPED | OUTPATIENT
Start: 2024-11-11

## 2024-11-11 RX ORDER — POTASSIUM CHLORIDE 20 MEQ/1
20 TABLET, EXTENDED RELEASE ORAL DAILY
Qty: 90 TABLET | Refills: 3 | Status: SHIPPED | OUTPATIENT
Start: 2024-11-11

## 2024-11-11 RX ORDER — METOPROLOL TARTRATE 25 MG/1
25 TABLET, FILM COATED ORAL 2 TIMES DAILY
Qty: 180 TABLET | Refills: 3 | Status: SHIPPED | OUTPATIENT
Start: 2024-11-11

## 2024-11-11 ASSESSMENT — ENCOUNTER SYMPTOMS
OCCASIONAL FEELINGS OF UNSTEADINESS: 0
LOSS OF SENSATION IN FEET: 0
DEPRESSION: 0

## 2024-11-11 ASSESSMENT — PATIENT HEALTH QUESTIONNAIRE - PHQ9
SUM OF ALL RESPONSES TO PHQ9 QUESTIONS 1 AND 2: 0
2. FEELING DOWN, DEPRESSED OR HOPELESS: NOT AT ALL
1. LITTLE INTEREST OR PLEASURE IN DOING THINGS: NOT AT ALL

## 2024-11-11 NOTE — PROGRESS NOTES
"Epicondylitis Subjective   Patient ID: Viki Boykin \"Deon" is a 69 y.o. female who presents for Follow-up (4 month ) and Elbow Injury (Right ).    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 Life style changes were discussed, always it works better if we eat plant based diet and plenty of fibres and roughage. Consume adequate amount of water and avoid alcohol, light to moderate physical activities and stress reduction are always beneficial for ongoing physical well being. Do not forget to have 6 to 7 hours of sleep regularly and avoid late night artur screen exposure.    HPI this is a 69-year-old patient with stroke with a left hemiplegia complicated with arthritis diabetic neuropathy multiple fall post fall right epicondylitis posttraumatic moderate pain and discomfort especially day and night    Negative for headache chest pain hematuria    Negative for hypoxia hypoglycemia    Negative for suicide    Negative for RSV flu or COVID    Review lab medications discussed with the patient multiple " falls related to the stroke weakness medications and diabetic neuropathy    Advised PT OT therapy    Given a Kenalog injection to the right elbow without any complication    Sent for the x-ray uric acid check    Patient going to monitor blood pressure blood sugar and any bleeding because of taking the Eliquis      Past Medical History:   Diagnosis Date    Abscess of vulva 06/22/2020    Boil, labium    Body mass index (BMI) 31.0-31.9, adult 06/30/2022    BMI 31.0-31.9,adult    Candidiasis of skin and nail 12/05/2016    Skin yeast infection    Candidiasis of skin and nail     Cutaneous candidiasis    Candidiasis, unspecified 06/19/2020    Yeast infection    Contact with and (suspected) exposure to covid-19 02/08/2021    Suspected COVID-19 virus infection    Encounter for screening for malignant neoplasm of colon 06/27/2022    Screen for colon cancer    Encounter for screening for malignant neoplasm of rectum 06/27/2022    Screening for rectal cancer    Essential (primary) hypertension 07/17/2020    Benign essential hypertension    Fracture of orbit, unspecified, initial encounter for closed fracture (Multi) 05/25/2021    Right orbital fracture    History of falling 12/02/2021    History of fall    Influenza due to unidentified influenza virus with other respiratory manifestations 03/05/2018    Bronchitis with flu    Osteoarthritis of knee, unspecified     Knee osteoarthritis    Overweight 06/30/2022    Overweight with body mass index (BMI) of 28 to 28.9 in adult    Pain in left shoulder 06/19/2020    Left shoulder pain    Personal history of other diseases of the circulatory system 12/02/2021    History of peripheral arterial disease    Personal history of other diseases of the digestive system 05/15/2019    History of constipation    Personal history of other diseases of the female genital tract 10/19/2017    History of vaginitis    Personal history of other diseases of the musculoskeletal system and connective  tissue     Personal history of osteoporosis    Personal history of other diseases of the musculoskeletal system and connective tissue 2019    History of muscle spasm    Personal history of other endocrine, nutritional and metabolic disease 2022    History of vitamin D deficiency    Personal history of other endocrine, nutritional and metabolic disease 2021    History of hypothyroidism    Personal history of other endocrine, nutritional and metabolic disease 2020    History of morbid obesity    Personal history of other endocrine, nutritional and metabolic disease 2020    History of hyperlipidemia    Personal history of other endocrine, nutritional and metabolic disease 2020    History of hypokalemia    Personal history of other infectious and parasitic diseases     History of mumps    Personal history of other infectious and parasitic diseases     History of measles    Personal history of other infectious and parasitic diseases     History of varicella    Personal history of pneumonia (recurrent)     History of pneumonia    Personal history of traumatic brain injury 2021    History of closed head injury    Personal history of urinary (tract) infections 2021    History of urinary tract infection    Type 2 diabetes mellitus with unspecified complications 2017    Type 2 diabetes mellitus with complication, unspecified long term insulin use status    Unspecified dislocation of left little finger, initial encounter     Dislocation of fifth finger, left, closed     Past Surgical History:   Procedure Laterality Date     SECTION, CLASSIC  2014     Section    SHOULDER SURGERY  2014    Shoulder Surgery    TONSILLECTOMY  2014    Tonsillectomy With Adenoidectomy     Allergies   Allergen Reactions    Cat Dander Swelling    Penicillins Other     PCN, did not work on patient.    Tree And Shrub Pollen Hives     Current Outpatient Medications  "  Medication Sig Dispense Refill    amLODIPine (Norvasc) 5 mg tablet TAKE 1 TABLET BY MOUTH TWICE  DAILY 180 tablet 3    calcium carbonate-vitamin D3 600 mg-5 mcg (200 unit) tablet Take by mouth once daily.      Eliquis 5 mg tablet TAKE 1 TABLET BY MOUTH TWICE  DAILY 180 tablet 3    flecainide (Tambocor) 100 mg tablet TAKE 1 TABLET BY MOUTH EVERY 12  HOURS 180 tablet 3    furosemide (Lasix) 20 mg tablet TAKE 1 TABLET BY MOUTH DAILY 90 tablet 3    insulin degludec (Tresiba FlexTouch U-200) 200 unit/mL (3 mL) injection INJECT SUBCUTANEOUSLY 58 UNITS  ONCE DAILY 27 mL 3    ketorolac (Toradol) 10 mg tablet Take 1 tablet (10 mg) by mouth every 12 hours if needed for moderate pain (4 - 6). 10 tablet 0    lisinopril 20 mg tablet Take 1 tablet (20 mg) by mouth once daily. 90 tablet 3    metFORMIN (Glucophage) 500 mg tablet TAKE 1 TABLET BY MOUTH 3 TIMES  DAILY 270 tablet 3    metoprolol tartrate (Lopressor) 25 mg tablet Take 1 tablet by mouth 2 times a day. 180 tablet 3    pen needle, diabetic (BD Ultra-Fine Dulce Pen Needle) 32 gauge x 5/32\" needle INJECTS ONCE A  each 3    potassium chloride CR 20 mEq ER tablet TAKE 1 TABLET BY MOUTH DAILY 90 tablet 3    rosuvastatin (Crestor) 10 mg tablet TAKE 1 TABLET BY MOUTH DAILY 90 tablet 3    tiZANidine (Zanaflex) 4 mg tablet Take 1 tablet (4 mg) by mouth every 12 hours if needed for muscle spasms. 10 tablet 0    zinc acetate 50 mg (zinc) capsule 50 mg.       No current facility-administered medications for this visit.     Family History   Problem Relation Name Age of Onset    Hypertension Mother      Diabetes Mother      Other (fibroid tumors) Mother      Diabetes Father      Heart disease Father       Social History     Socioeconomic History    Marital status:    Tobacco Use    Smoking status: Never    Smokeless tobacco: Never   Substance and Sexual Activity    Alcohol use: Yes     Comment: rare    Drug use: Never     Immunization History   Administered Date(s) " "Administered    Flu vaccine (IIV4), preservative free *Check age/dose* 01/09/2018, 09/24/2021    Flu vaccine, quadrivalent, high-dose, preservative free, age 65y+ (FLUZONE) 10/12/2023    Flu vaccine, trivalent, preservative free, HIGH-DOSE, age 65y+ (Fluzone) 11/11/2024    Flu vaccine, trivalent, preservative free, age 6 months and greater (Fluarix/Fluzone/Flulaval) 12/01/2015, 09/12/2016    Influenza, Unspecified 11/16/2009, 10/12/2010, 11/12/2012, 08/29/2013, 09/22/2014, 12/01/2015, 09/12/2016, 09/07/2018, 09/09/2019, 10/13/2020, 10/18/2022    Influenza, seasonal, injectable 11/16/2009, 10/12/2010, 11/12/2012, 08/29/2013, 09/22/2014, 12/01/2015, 09/12/2016, 09/07/2018, 10/13/2020    Novel influenza-H1N1-09, preservative-free 01/13/2010    Pfizer Gray Cap SARS-CoV-2 04/30/2022    Pfizer Purple Cap SARS-CoV-2 03/30/2021, 04/20/2021    Pneumococcal conjugate vaccine, 20-valent (PREVNAR 20) 02/13/2023    Pneumococcal polysaccharide vaccine, 23-valent, age 2 years and older (PNEUMOVAX 23) 06/19/2020    RSV, 60 Years And Older (AREXVY) 01/03/2024    SARS-CoV-2, Unspecified 04/30/2022    Td vaccine, age 7 years and older (TDVAX) 11/20/2007    Tdap vaccine, age 7 year and older (BOOSTRIX, ADACEL) 05/23/2021    Tetanus toxoid, adsorbed 11/20/2007    Zoster vaccine, recombinant, adult (SHINGRIX) 01/18/2024, 04/19/2024       Review of Systems  Review of systems is otherwise negative unless stated above or in history of present illness.    Objective   Visit Vitals  /70   Pulse 52   Temp 36.4 °C (97.6 °F)   Ht 1.778 m (5' 10\")   Wt 101 kg (221 lb 9.6 oz)   SpO2 99%   BMI 31.80 kg/m²   Smoking Status Never   BSA 2.23 m²     Physical Exam  Constitutional: BMI 31     General: not in acute distress.   HENT:      Head: Normocephalic and atraumatic.      Nose: Nose normal.   Eyes: Eyeglasses     Extraocular Movements: Extraocular movements intact.      Conjunctiva/sclera: Conjunctivae normal.   Cardiovascular: Heart murmur     " Rate and Rhythm: Normal rate ,  No M/R/G  Pulmonary:      Effort: Pulmonary effort is normal.      Breath sounds: Normal, Bilat Equal AE  Skin: Multiple skin bruises   general: Skin is warm.   Neurological: Left hemiplegia diabetic neuropathy     Mental Status: He is alert and oriented to person, place, and time.   Psychiatric:    Not suicidal     Mood and Affect: Mood normal.         Behavior: Behavior normal.   Musculoskeletal osteoarthritis tendinitis right elbow  FROM in all extremitirs,  Joint-no swelling or tenderness    No visits with results within 4 Month(s) from this visit.   Latest known visit with results is:   Orders Only on 07/08/2024   Component Date Value Ref Range Status    [facility] Nucleated RBC 07/08/2024 0  /100WBC Final    [facility] HCT 07/08/2024 40.9  36.0 - 46.0 % Final    [facility] Platelet 07/08/2024 278  150 - 450 x10 Final    [facility] RBC 07/08/2024 4.97  4.20 - 5.40 x10 Final    [facility] Instr WBC 07/08/2024 6.2   Final    [facility] MCHC 07/08/2024 34.2  32.0 - 37.0 g/dL Final    [facility] MCV 07/08/2024 82.3  80.0 - 100.0 fL Final    [facility] HGB 07/08/2024 14.0  12.0 - 16.0 g/dL Final    [facility] MPV 07/08/2024 7.9  7.4 - 10.4 fL Final    [facility] RDW 07/08/2024 13.8  11.5 - 14.5 % Final    [facility] WBC 07/08/2024 6.2  4.5 - 11.0 x10 Final    [facility] MCH 07/08/2024 28.1  27.0 - 34.0 pg Final    [facility] DIFF? 07/08/2024 No   Final    [facility] Lymph Count 07/08/2024 1.63  1.20 - 4.80 x1000 Final    [facility] Basos % 07/08/2024 1.2  % Final    [facility] Baso Count 07/08/2024 0.07  0.00 - 0.20 x1000 Final    [facility] Mono Count 07/08/2024 0.46  0.10 - 1.00 x1000 Final    [facility] Mono % 07/08/2024 7.4  % Final    [facility] Neutrophil % 07/08/2024 63.5  % Final    [facility] Neutrophil Count (ANC) 07/08/2024 3.92  1.40 - 8.80 x1000 Final    [facility] Lymph % 07/08/2024 26.3  % Final    [facility] Eos Count 07/08/2024 0.10  0.00 - 0.50 x1000 Final    [facility] Eosin % 07/08/2024 1.6  % Final    [facility] HGB A1C 07/08/2024 7.3  % Final    [facility] Microalbumin/Creatinine* 07/08/2024 52 (H)  0 - 30 mg MALB/gm CREAT Final    [facility] Microalbumin, Urine mg/L 07/08/2024 18.0  mg/L Final    [facility] Creatinine, Urine mg/dl 07/08/2024 34.9  mg/dL Final    [facility] TIBC 07/08/2024 306  250 - 425 ug/ml Final    [facility] IRON 07/08/2024 54  50 - 170 ug/dl Final    [facility] Saturation 07/08/2024 17.6 (L)  20.0 - 50.0 % Final    [facility] Magnesium 07/08/2024 1.8  1.6 - 2.6 mg/dL Final    [facility] FERRITIN 07/08/2024 101  10 - 291 ng/mL Final    [facility] Uric Acid 07/08/2024 4.9  3.1 - 7.8 mg/dL Final    [facility] Vitamin B12 07/08/2024 267  211 - 911 pg/mL Final    [facility] K 07/08/2024 4.0  3.5 - 5.1 mmol/L Final    [facility] GOT 07/08/2024 13 (L)  15 - 37 unit/L Final    [facility] Calcium 07/08/2024 9.7  8.7 - 10.4 mg/dL Final    [facility] BUN 07/08/2024 19  9 - 23 mg/dL Final    [facility] GFR AA 07/08/2024 >60   Final    [facility] Glucose 07/08/2024 85  74 - 106 mg/dL Final    [facility] Bilirubin, Total 07/08/2024 0.90  0.30 - 1.20 mg/dL Final    [facility] ALB 07/08/2024 3.7  3.4 - 5.0 g/dL Final    [facility] Chloride 07/08/2024 105  98 - 107 mmol/L Final    [facility] A/G Ratio 07/08/2024 1.2   Final    [facility] BUN/Creat Ratio 07/08/2024 23.8   Final    [facility] GPT 07/08/2024 13  10 - 49 unit/L Final    [facility] Na 07/08/2024 141  135 - 145 mmol/L Final    [facility] Creatinine 07/08/2024 0.8  0.5 - 0.8 mg/dL Final    [facility] Alk Phos 07/08/2024 46  45 - 117 unit/L Final    [facility] Total Protein 07/08/2024 6.9  5.7 - 8.2 g/dL Final    [facility] CO2, venous 07/08/2024 30.0  20.0 - 31.0 mmol/L Final    [facility] Calculated Osmolality 07/08/2024 283  275 - 295 mOsm/kg Final    [facility] Glomerular Filtration R* 07/08/2024 >60  mL/min/1.73m? Final    [facility] Globulin 07/08/2024 3.2  g/dL Final    [facility] TSH 07/08/2024 1.47   0.55 - 4.78 uIU/ml Final    [facility] Triglycerides 07/08/2024 99  30 - 150 mg/dL Final    [facility] Cholesterol 07/08/2024 137  100 - 200 mg/dL Final    [facility] Calculated LDL Choleste* 07/08/2024 74  60 - 130 mg/dL Final    [facility] HDL Cholesterol 07/08/2024 43  40 - 60 mg/dL Final    [facility] Total Chol/HDL Chol Rat* 07/08/2024 3.2   Final    [facility] Hepatitis C Antibody 07/08/2024 Nonreactive   Final    [facility] U MICRO 07/08/2024 Indicated   Final    [facility] Specific Gravity, U 07/08/2024 1.008  1.001 - 1.035 Final    [facility] Nitrite, U 07/08/2024 Negative  Negative Final    [facility] Bilirubin, U 07/08/2024 Negative  Negative Final    [facility] Protein, U 07/08/2024 Negative  Negative Final    [facility] Squamous Epithelial Indigo* 07/08/2024 <1  #/HPF Final    [facility] Appearance, U 07/08/2024 Clear   Final    [facility] RBC/HPF, U 07/08/2024 <1  0 - 3 #/HPF Final    [facility] Blood, U 07/08/2024 Negative  Negative Final    [facility] Leukocyte Esterase, U 07/08/2024 Trace (A)  Negative Final    [facility] Ketones, U 07/08/2024 Negative  Negative Final    [facility] Urobilinogen Qual, U 07/08/2024 <2.0 mg/dl  <2.0 mg/dl Final    [facility] Glucose Qual, U 07/08/2024 Negative  Negative Final    [facility] WBC/HPF, U 07/08/2024 1  0 - 5 #/HPF Final    [facility] Color, U 07/08/2024 Straw   Final    [facility] pH, U 07/08/2024 6.0  4.5 - 8.0 Final       Radiology: Reviewed imaging in powerchart.  No results found.      Charting was completed using voice recognition technology and may include unintended errors.

## 2024-11-11 NOTE — ASSESSMENT & PLAN NOTE
Sent for PT OT x-ray uric acid given Kenalog 40 mg injection to right elbow without any complication

## 2024-11-11 NOTE — ASSESSMENT & PLAN NOTE
CVA with left hemiplegia because of the diabetic neuropathy and stroke multiple fall advised fall prevention therapy check magnesium and iron level

## 2024-11-18 LAB
NON-UH HIE APPEARANCE, U: CLEAR
NON-UH HIE BASO COUNT: 0.06 X1000 (ref 0–0.2)
NON-UH HIE BASOS %: 0.7 %
NON-UH HIE BILIRUBIN, U: NEGATIVE
NON-UH HIE BLOOD, U: NEGATIVE
NON-UH HIE BUN/CREAT RATIO: 20
NON-UH HIE BUN: 16 MG/DL (ref 9–23)
NON-UH HIE CALCIUM: 10.3 MG/DL (ref 8.7–10.4)
NON-UH HIE CALCULATED LDL CHOLESTEROL: 86 MG/DL (ref 60–130)
NON-UH HIE CALCULATED OSMOLALITY: 281 MOSM/KG (ref 275–295)
NON-UH HIE CHLORIDE: 103 MMOL/L (ref 98–107)
NON-UH HIE CHOLESTEROL: 155 MG/DL (ref 100–200)
NON-UH HIE CO2, VENOUS: 30 MMOL/L (ref 20–31)
NON-UH HIE COLOR, U: COLORLESS
NON-UH HIE CREATININE, URINE MG/DL: 26 MG/DL
NON-UH HIE CREATININE: 0.8 MG/DL (ref 0.5–0.8)
NON-UH HIE DIFF?: NO
NON-UH HIE EOS COUNT: 0.1 X1000 (ref 0–0.5)
NON-UH HIE EOSIN %: 1.1 %
NON-UH HIE GFR AA: >60
NON-UH HIE GLOMERULAR FILTRATION RATE: >60 ML/MIN/1.73M?
NON-UH HIE GLUCOSE QUAL, U: NEGATIVE
NON-UH HIE GLUCOSE: 77 MG/DL (ref 74–106)
NON-UH HIE HCT: 43.6 % (ref 36–46)
NON-UH HIE HDL CHOLESTEROL: 46 MG/DL (ref 40–60)
NON-UH HIE HGB A1C: 7.3 %
NON-UH HIE HGB: 14.4 G/DL (ref 12–16)
NON-UH HIE INSTR WBC: 9.4
NON-UH HIE K: 4.2 MMOL/L (ref 3.5–5.1)
NON-UH HIE KETONES, U: NEGATIVE
NON-UH HIE LEUKOCYTE ESTERASE, U: NEGATIVE
NON-UH HIE LYMPH %: 17.5 %
NON-UH HIE LYMPH COUNT: 1.64 X1000 (ref 1.2–4.8)
NON-UH HIE MAGNESIUM: 1.9 MG/DL (ref 1.6–2.6)
NON-UH HIE MCH: 27.5 PG (ref 27–34)
NON-UH HIE MCHC: 33.1 G/DL (ref 32–37)
NON-UH HIE MCV: 83.2 FL (ref 80–100)
NON-UH HIE MICROALBUMIN, URINE MG/L: 21 MG/L
NON-UH HIE MICROALBUMIN/CREATININE RATIO: 81 MG MALB/GM CREAT (ref 0–30)
NON-UH HIE MONO %: 7.3 %
NON-UH HIE MONO COUNT: 0.69 X1000 (ref 0.1–1)
NON-UH HIE MPV: 7.7 FL (ref 7.4–10.4)
NON-UH HIE NA: 141 MMOL/L (ref 135–145)
NON-UH HIE NEUTROPHIL %: 73.5 %
NON-UH HIE NEUTROPHIL COUNT (ANC): 6.9 X1000 (ref 1.4–8.8)
NON-UH HIE NITRITE, U: NEGATIVE
NON-UH HIE NUCLEATED RBC: 0 /100WBC
NON-UH HIE PH, U: 6 (ref 4.5–8)
NON-UH HIE PLATELET: 298 X10 (ref 150–450)
NON-UH HIE PROTEIN, U: NEGATIVE
NON-UH HIE RBC: 5.25 X10 (ref 4.2–5.4)
NON-UH HIE RDW: 14.5 % (ref 11.5–14.5)
NON-UH HIE SPECIFIC GRAVITY, U: 1.01 (ref 1–1.03)
NON-UH HIE TOTAL CHOL/HDL CHOL RATIO: 3.4
NON-UH HIE TRIGLYCERIDES: 115 MG/DL (ref 30–150)
NON-UH HIE U MICRO: NORMAL
NON-UH HIE URIC ACID: 4 MG/DL (ref 3.1–7.8)
NON-UH HIE UROBILINOGEN QUAL, U: NORMAL
NON-UH HIE WBC: 9.4 X10 (ref 4.5–11)

## 2024-11-25 ENCOUNTER — TELEPHONE (OUTPATIENT)
Dept: PRIMARY CARE | Facility: CLINIC | Age: 69
End: 2024-11-25
Payer: MEDICARE

## 2025-02-25 ENCOUNTER — APPOINTMENT (OUTPATIENT)
Dept: PRIMARY CARE | Facility: CLINIC | Age: 70
End: 2025-02-25
Payer: MEDICARE

## 2025-02-25 VITALS
DIASTOLIC BLOOD PRESSURE: 78 MMHG | OXYGEN SATURATION: 97 % | BODY MASS INDEX: 31.87 KG/M2 | TEMPERATURE: 97.4 F | HEART RATE: 65 BPM | HEIGHT: 70 IN | WEIGHT: 222.6 LBS | SYSTOLIC BLOOD PRESSURE: 131 MMHG

## 2025-02-25 DIAGNOSIS — N39.0 UTI (URINARY TRACT INFECTION), UNCOMPLICATED: ICD-10-CM

## 2025-02-25 DIAGNOSIS — I10 DIABETES MELLITUS WITH COINCIDENT HYPERTENSION (MULTI): ICD-10-CM

## 2025-02-25 DIAGNOSIS — E11.9 DIABETES MELLITUS WITH COINCIDENT HYPERTENSION (MULTI): ICD-10-CM

## 2025-02-25 DIAGNOSIS — Z00.00 MEDICARE ANNUAL WELLNESS VISIT, SUBSEQUENT: Primary | ICD-10-CM

## 2025-02-25 DIAGNOSIS — E11.49 DIABETES MELLITUS TYPE 2 WITH NEUROLOGICAL MANIFESTATIONS (MULTI): ICD-10-CM

## 2025-02-25 DIAGNOSIS — E78.2 DM TYPE 2 WITH DIABETIC MIXED HYPERLIPIDEMIA (MULTI): ICD-10-CM

## 2025-02-25 DIAGNOSIS — E11.69 DM TYPE 2 WITH DIABETIC MIXED HYPERLIPIDEMIA (MULTI): ICD-10-CM

## 2025-02-25 DIAGNOSIS — I48.20 CHRONIC ATRIAL FIBRILLATION (MULTI): ICD-10-CM

## 2025-02-25 DIAGNOSIS — I63.9 CEREBROVASCULAR ACCIDENT (CVA), UNSPECIFIED MECHANISM (MULTI): ICD-10-CM

## 2025-02-25 PROCEDURE — 3078F DIAST BP <80 MM HG: CPT | Performed by: INTERNAL MEDICINE

## 2025-02-25 PROCEDURE — 1159F MED LIST DOCD IN RCRD: CPT | Performed by: INTERNAL MEDICINE

## 2025-02-25 PROCEDURE — 1123F ACP DISCUSS/DSCN MKR DOCD: CPT | Performed by: INTERNAL MEDICINE

## 2025-02-25 PROCEDURE — 99213 OFFICE O/P EST LOW 20 MIN: CPT | Performed by: INTERNAL MEDICINE

## 2025-02-25 PROCEDURE — 1170F FXNL STATUS ASSESSED: CPT | Performed by: INTERNAL MEDICINE

## 2025-02-25 PROCEDURE — 1036F TOBACCO NON-USER: CPT | Performed by: INTERNAL MEDICINE

## 2025-02-25 PROCEDURE — 3008F BODY MASS INDEX DOCD: CPT | Performed by: INTERNAL MEDICINE

## 2025-02-25 PROCEDURE — 3075F SYST BP GE 130 - 139MM HG: CPT | Performed by: INTERNAL MEDICINE

## 2025-02-25 PROCEDURE — 99497 ADVNCD CARE PLAN 30 MIN: CPT | Performed by: INTERNAL MEDICINE

## 2025-02-25 PROCEDURE — 4010F ACE/ARB THERAPY RXD/TAKEN: CPT | Performed by: INTERNAL MEDICINE

## 2025-02-25 PROCEDURE — G0439 PPPS, SUBSEQ VISIT: HCPCS | Performed by: INTERNAL MEDICINE

## 2025-02-25 PROCEDURE — 1160F RVW MEDS BY RX/DR IN RCRD: CPT | Performed by: INTERNAL MEDICINE

## 2025-02-25 RX ORDER — SEMAGLUTIDE 0.68 MG/ML
0.25 INJECTION, SOLUTION SUBCUTANEOUS
Qty: 6 ML | Refills: 2 | Status: SHIPPED | OUTPATIENT
Start: 2025-02-25

## 2025-02-25 ASSESSMENT — ACTIVITIES OF DAILY LIVING (ADL)
DOING_HOUSEWORK: INDEPENDENT
BATHING: INDEPENDENT
DRESSING: INDEPENDENT
TAKING_MEDICATION: INDEPENDENT
MANAGING_FINANCES: INDEPENDENT
GROCERY_SHOPPING: INDEPENDENT

## 2025-02-25 ASSESSMENT — PATIENT HEALTH QUESTIONNAIRE - PHQ9
1. LITTLE INTEREST OR PLEASURE IN DOING THINGS: NOT AT ALL
1. LITTLE INTEREST OR PLEASURE IN DOING THINGS: NOT AT ALL
SUM OF ALL RESPONSES TO PHQ9 QUESTIONS 1 AND 2: 0
SUM OF ALL RESPONSES TO PHQ9 QUESTIONS 1 AND 2: 0
2. FEELING DOWN, DEPRESSED OR HOPELESS: NOT AT ALL
2. FEELING DOWN, DEPRESSED OR HOPELESS: NOT AT ALL

## 2025-02-25 NOTE — PROGRESS NOTES
Assessment and Plan:  Problem List Items Addressed This Visit       Chronic atrial fibrillation (Multi)    Relevant Orders    Albumin-Creatinine Ratio, Urine Random    CBC and Auto Differential    Comprehensive Metabolic Panel    Hemoglobin A1C    Lipid Panel    Magnesium    TSH with reflex to Free T4 if abnormal    Uric Acid    Urinalysis with Reflex Microscopic    Cerebrovascular accident (CVA) (Multi)    Relevant Orders    Albumin-Creatinine Ratio, Urine Random    CBC and Auto Differential    Comprehensive Metabolic Panel    Hemoglobin A1C    Lipid Panel    Magnesium    TSH with reflex to Free T4 if abnormal    Uric Acid    Urinalysis with Reflex Microscopic    Medicare annual wellness visit, subsequent - Primary    Relevant Orders    Albumin-Creatinine Ratio, Urine Random    CBC and Auto Differential    Comprehensive Metabolic Panel    Hemoglobin A1C    Lipid Panel    Magnesium    TSH with reflex to Free T4 if abnormal    Uric Acid    Urinalysis with Reflex Microscopic    Diabetes mellitus with coincident hypertension (Multi)    Relevant Orders    Albumin-Creatinine Ratio, Urine Random    CBC and Auto Differential    Comprehensive Metabolic Panel    Hemoglobin A1C    Lipid Panel    Magnesium    TSH with reflex to Free T4 if abnormal    Uric Acid    Urinalysis with Reflex Microscopic    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)    Relevant Medications    semaglutide (Ozempic) 0.25 mg or 0.5 mg (2 mg/3 mL) pen injector    Other Relevant Orders    Albumin-Creatinine Ratio, Urine Random    CBC and Auto Differential    Comprehensive Metabolic Panel    Hemoglobin A1C    Lipid Panel    Magnesium    TSH with reflex to Free T4 if abnormal    Uric Acid    Urinalysis with Reflex Microscopic    DM type 2 with diabetic mixed hyperlipidemia (Multi)    Relevant Orders    Albumin-Creatinine Ratio, Urine Random    CBC and Auto Differential    Comprehensive Metabolic Panel    Hemoglobin A1C    Lipid Panel     Magnesium    TSH with reflex to Free T4 if abnormal    Uric Acid    Urinalysis with Reflex Microscopic    UTI (urinary tract infection), uncomplicated    Relevant Orders    Albumin-Creatinine Ratio, Urine Random    CBC and Auto Differential    Comprehensive Metabolic Panel    Hemoglobin A1C    Lipid Panel    Magnesium    TSH with reflex to Free T4 if abnormal    Uric Acid    Urinalysis with Reflex Microscopic         Chief Complaint:   Annual Medicare Wellness Office Exam/Comprehensive Problem Focused Follow Up and Physical Exam weakness obesity sleep apnea      HPI: 69-year-old patient  with the children review of social family history    Complaining of the palpitation dizziness dyspnea weakness obesity    Negative for fall or headache    Negative for bleeding    Negative for hypoxia hypoglycemia hypotension's    Evaluated by myself or cardiologist diagnosis of sleep apnea advised to get a CPAP    Hemoglobin A1c 7.3 LDL 86 microalbuminuria add on Ozempic side effect explained    Personal family history negative for medullary thyroid cancer or pancreatic cancer    Long discussion about the depressions a DNR discussed with the patient's and family        Patient Active Problem List:  Patient Active Problem List   Diagnosis    Chronic atrial fibrillation (Multi)    Cerebrovascular accident (CVA) (Multi)    Medicare annual wellness visit, subsequent    Diabetes mellitus with coincident hypertension (Multi)    Need for influenza vaccination    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)    DM type 2 with diabetic mixed hyperlipidemia (Multi)    UTI (urinary tract infection), uncomplicated    Idiopathic hematuria with minor glomerular abnormality    Epicondylitis, lateral, right    Pain          Comprehensive Medical/Surgical/Social/Family History:  Family History   Problem Relation Name Age of Onset    Hypertension Mother      Diabetes Mother      Other (fibroid tumors) Mother       Diabetes Father      Heart disease Father         Past Medical History:   Diagnosis Date    Abscess of vulva 06/22/2020    Boil, labium    Body mass index (BMI) 31.0-31.9, adult 06/30/2022    BMI 31.0-31.9,adult    Candidiasis of skin and nail 12/05/2016    Skin yeast infection    Candidiasis of skin and nail     Cutaneous candidiasis    Candidiasis, unspecified 06/19/2020    Yeast infection    Contact with and (suspected) exposure to covid-19 02/08/2021    Suspected COVID-19 virus infection    Encounter for screening for malignant neoplasm of colon 06/27/2022    Screen for colon cancer    Encounter for screening for malignant neoplasm of rectum 06/27/2022    Screening for rectal cancer    Essential (primary) hypertension 07/17/2020    Benign essential hypertension    Fracture of orbit, unspecified, initial encounter for closed fracture (Multi) 05/25/2021    Right orbital fracture    History of falling 12/02/2021    History of fall    Influenza due to unidentified influenza virus with other respiratory manifestations 03/05/2018    Bronchitis with flu    Osteoarthritis of knee, unspecified     Knee osteoarthritis    Overweight 06/30/2022    Overweight with body mass index (BMI) of 28 to 28.9 in adult    Pain in left shoulder 06/19/2020    Left shoulder pain    Personal history of other diseases of the circulatory system 12/02/2021    History of peripheral arterial disease    Personal history of other diseases of the digestive system 05/15/2019    History of constipation    Personal history of other diseases of the female genital tract 10/19/2017    History of vaginitis    Personal history of other diseases of the musculoskeletal system and connective tissue     Personal history of osteoporosis    Personal history of other diseases of the musculoskeletal system and connective tissue 11/11/2019    History of muscle spasm    Personal history of other endocrine, nutritional and metabolic disease 11/04/2022    History of  vitamin D deficiency    Personal history of other endocrine, nutritional and metabolic disease 2021    History of hypothyroidism    Personal history of other endocrine, nutritional and metabolic disease 2020    History of morbid obesity    Personal history of other endocrine, nutritional and metabolic disease 2020    History of hyperlipidemia    Personal history of other endocrine, nutritional and metabolic disease 2020    History of hypokalemia    Personal history of other infectious and parasitic diseases     History of mumps    Personal history of other infectious and parasitic diseases     History of measles    Personal history of other infectious and parasitic diseases     History of varicella    Personal history of pneumonia (recurrent)     History of pneumonia    Personal history of traumatic brain injury 2021    History of closed head injury    Personal history of urinary (tract) infections 2021    History of urinary tract infection    Type 2 diabetes mellitus with unspecified complications 2017    Type 2 diabetes mellitus with complication, unspecified long term insulin use status    Unspecified dislocation of left little finger, initial encounter     Dislocation of fifth finger, left, closed       Past Surgical History:   Procedure Laterality Date     SECTION, CLASSIC  2014     Section    SHOULDER SURGERY  2014    Shoulder Surgery    TONSILLECTOMY  2014    Tonsillectomy With Adenoidectomy       Social History     Socioeconomic History    Marital status:    Tobacco Use    Smoking status: Never    Smokeless tobacco: Never   Substance and Sexual Activity    Alcohol use: Yes     Comment: rare    Drug use: Never       Tobacco/Alcohol/Opioid use, as well as Illicit Drug Use was screened for/reviewed and documented in Social Documentation section of the chart and medication list as appropriate    Allergies and  "Medications  Allergies   Allergen Reactions    Cat Dander Swelling    Penicillins Other     PCN, did not work on patient.    Tree [place] Shrub Pollen Hives     Current Outpatient Medications   Medication Sig Dispense Refill    amLODIPine (Norvasc) 5 mg tablet Take 1 tablet (5 mg) by mouth 2 times a day. 180 tablet 3    apixaban (Eliquis) 5 mg tablet Take 1 tablet (5 mg) by mouth 2 times a day. 180 tablet 3    calcium carbonate-vitamin D3 600 mg-5 mcg (200 unit) tablet Take by mouth once daily.      flecainide (Tambocor) 100 mg tablet Take 1 tablet (100 mg) by mouth every 12 hours. 180 tablet 3    furosemide (Lasix) 20 mg tablet Take 1 tablet (20 mg) by mouth once daily. 90 tablet 3    insulin degludec (Tresiba FlexTouch U-200) 200 unit/mL (3 mL) injection INJECT SUBCUTANEOUSLY 58 UNITS  ONCE DAILY 27 mL 3    lisinopril 20 mg tablet Take 1 tablet (20 mg) by mouth once daily. 90 tablet 3    metFORMIN (Glucophage) 500 mg tablet Take 1 tablet (500 mg) by mouth 3 times a day. 270 tablet 3    metoprolol tartrate (Lopressor) 25 mg tablet Take 1 tablet (25 mg) by mouth 2 times a day. 180 tablet 3    pen needle, diabetic (BD Ultra-Fine Dulce Pen Needle) 32 gauge x 5/32\" needle INJECTS ONCE A  each 3    potassium chloride CR 20 mEq ER tablet Take 1 tablet (20 mEq) by mouth once daily. 90 tablet 3    rosuvastatin (Crestor) 10 mg tablet Take 1 tablet (10 mg) by mouth once daily. 90 tablet 3    tiZANidine (Zanaflex) 4 mg tablet Take 1 tablet (4 mg) by mouth every 12 hours if needed for muscle spasms. 10 tablet 0    zinc acetate 50 mg (zinc) capsule 50 mg.      semaglutide (Ozempic) 0.25 mg or 0.5 mg (2 mg/3 mL) pen injector Inject 0.25 mg under the skin every 7 days. 6 mL 2     No current facility-administered medications for this visit.       Medications and Supplements  prescribed by me and other practitioners or clinical pharmacist (such as prescriptions, OTC's, herbal therapies and supplements) were reviewed and " "documented in the medical record.     Advance directives  Advanced Care Planning (including a Living Will, Healthcare POA, as well as specific end of life choices and/or directives), was discussed for approximately 16 minutes with the patient and/or surrogate, voluntarily, and documented in the Problem List of the medical record.     Cardiac Risk Assessment  Cardiovascular risk was discussed and, if needed, lifestyle modifications recommended, including nutritional choices, exercise, and elimination of habits contributing to risk. We agreed on a plan to reduce the current cardiovascular risk based on above discussion as needed.  Aspirin use/disuse was discussed and documented in the Problem List of the medical record after reviewing the updated guidelines below:    Consider low dose Aspirin ( mg) use if the benefit for cardiovascular disease prevention outweighs risk for bleeding complications.   In general, low dose ASA should be considered:  In patients WITHOUT prior MI/stroke/PAD (primary prevention):   a. Age <60: Use if 10-year cardiovascular disease risk >20%, with discussion of risks and benefits with patient  b. Age 60-<70: Use if 10-year cardiovascular disease risk >20% and low bleeding (e.g., gastrointenstinal) risk, with discussion of risks and benefits with patient  c. Age >=70: Do not use    In patients WITH prior MI/stroke/PAD (secondary prevention):   Generally use unless extremely high bleeding (e.g., gastrointenstinal) risk, with discussion of risks and benefits with patient    ROS otherwise negative aside from what was mentioned above in HPI.    Visit Vitals  /78   Pulse 65   Temp 36.3 °C (97.4 °F)   Ht 1.778 m (5' 10\")   Wt 101 kg (222 lb 9.6 oz)   SpO2 97%   BMI 31.94 kg/m²   Smoking Status Never   BSA 2.23 m²       Physical Exam  Physical Exam:    Appearance: Alert, oriented , cooperative,  in no acute distress. Well nourished & well hydrated.    Skin: Intact,  dry skin, no lesions, " rash, petechiae or purpura.     Eyes: PERRLA, EOMs intact,  Conjunctiva pink with no redness or exudates. Cornea & anterior chamber are clear, Eyelids without lesions. No scleral icterus.     ENT: Hearing grossly intact. External auditory canals patent, tympanic membranes intact with visible landmarks. Nares patent, mucus membranes moist. Dentition without lesions. Pharynx clear, uvula midline.     Neck: No carotid bruit or JVD or thyromegaly    Pulmonary: Rhonchi.    Cardiac: Irregular    Abdomen: Mild epigastric tenderness    Genitourinary: Exam deferred.    Musculoskeletal: Arthritis of hip and knee.    Neurological: Chronic left hemiplegia unchanged  Psychiatric: Appropriate mood and affect.         During the course of the visit the patient was educated and counseled about age appropriate screening and preventive services. Completed preventive screenings were documented in the chart and orders were placed for outstanding screenings/procedures as documented in the Assessment and Plan.    Patient Instructions (the written plan) was given to the patient at check out.    Charting was completed using voice recognition technology and may include unintended errors.

## 2025-04-20 DIAGNOSIS — E11.49 DIABETES MELLITUS TYPE 2 WITH NEUROLOGICAL MANIFESTATIONS (MULTI): ICD-10-CM

## 2025-04-21 RX ORDER — SEMAGLUTIDE 0.68 MG/ML
INJECTION, SOLUTION SUBCUTANEOUS
Qty: 6 ML | Refills: 4 | Status: SHIPPED | OUTPATIENT
Start: 2025-04-21

## 2025-04-27 PROCEDURE — 99223 1ST HOSP IP/OBS HIGH 75: CPT | Performed by: FAMILY MEDICINE

## 2025-04-28 PROCEDURE — 99232 SBSQ HOSP IP/OBS MODERATE 35: CPT | Performed by: INTERNAL MEDICINE

## 2025-05-23 ENCOUNTER — TELEPHONE (OUTPATIENT)
Dept: PRIMARY CARE | Facility: CLINIC | Age: 70
End: 2025-05-23
Payer: MEDICARE

## 2025-05-27 ENCOUNTER — PATIENT OUTREACH (OUTPATIENT)
Dept: PRIMARY CARE | Facility: CLINIC | Age: 70
End: 2025-05-27
Payer: MEDICARE

## 2025-05-27 ENCOUNTER — TELEPHONE (OUTPATIENT)
Dept: PRIMARY CARE | Facility: CLINIC | Age: 70
End: 2025-05-27
Payer: MEDICARE

## 2025-05-27 NOTE — PROGRESS NOTES
Discharge facility: [facility] [place]   Discharge diagnosis:  Acute kidney injury     Acute hypokalemia   C. difficile colitis      Admission date: 5/20/25  Discharge date: 5/23/25    PCP Appointment Date: 6/5/25, appt changed to hospital follow up, message sent to office  Specialist Appointment Date:   Hospital Encounter and Summary: Linked    Unknown     See Discharge assessment below for further details    Wrap Up  Wrap Up Additional Comments: Received call back from patient. She states home care nurse was just to her home. reviewed all her medications with her. States she is taking them as ordered. She continues to have diarrhea. She is trying to stay hydrated with increasing fluid intake. She has appt scheduled with PCP. Encouraged her to bring all medications and discharge paperwork with her to follow up appt. No concerns at this time (5/27/2025  1:26 PM)    Medications  Medications reviewed with patient/caregiver?: No (Patient declined as she just reviewed with home care nurse) (5/27/2025  1:26 PM)  Is the patient having any side effects they believe may be caused by any medication additions or changes?: No (5/27/2025  1:26 PM)  Does the patient have all medications ordered at discharge?: Yes (5/27/2025  1:26 PM)  Care Management Interventions: No intervention needed (5/27/2025  1:26 PM)  Prescription Comments: Prescriptins sent for colestipol 1 g oral tablet  2 g 2 tabs, ORAL, BID, 40 tabs,    Tablet, 2 tabs ORAL BID,x10 days,      Crestor 10 mg oral tablet  10 mg 1 tabs, ORAL, DAILY     Lopressor 100 mg oral tablet  100 mg 1 tabs, ORAL, BID,     nystatin 100,000 units/g topical powder  1 keyshawn, Topical, BID   vancomycin 250 mg oral capsule  250 mg, ORAL, TID,      Zestril 20 mg oral tablet  20 mg 1 tabs, ORAL, DAILY (5/27/2025  1:26 PM)  Is the patient taking all medications as directed (includes completed medication regime)?: Yes (5/27/2025  1:26 PM)  Care Management Interventions: Notified  provider (5/27/2025  1:26 PM)  Medication Comments: Patient reports she just reviewed with the home care nurse and understands them (5/27/2025  1:26 PM)  Follow Up Tasks: -- (refills for new medications if to continue) (5/27/2025  1:26 PM)    Appointments  Does the patient have a primary care provider?: Yes (5/27/2025  1:26 PM)  Care Management Interventions: Verified appointment date/time/provider (6/5/25) (5/27/2025  1:26 PM)  Has the patient kept scheduled appointments due by today?: Yes (5/27/2025  1:26 PM)  Care Management Interventions: Advised patient to keep appointment; Educated on importance of keeping appointment (5/27/2025  1:26 PM)  Follow Up Tasks: -- (n/a) (5/27/2025  1:26 PM)    Self Management  What is the home health agency?: [facility] (5/27/2025  1:26 PM)  Has home health visited the patient within 72 hours of discharge?: Yes (5/27/2025  1:26 PM)  Home Health Interventions: -- (n/a) (5/27/2025  1:26 PM)  What Durable Medical Equipment (DME) was ordered?: n/a (5/27/2025  1:26 PM)  Has all Durable Medical Equipment (DME) been delivered?: -- (n/a) (5/27/2025  1:26 PM)  DME Interventions: -- (n/a) (5/27/2025  1:26 PM)  Care Management Interventions: Notified PCP/provider (5/27/2025  1:26 PM)  Follow Up Tasks: -- (n/a) (5/27/2025  1:26 PM)    Patient Teaching  Does the patient have access to their discharge instructions?: Yes (5/27/2025  1:26 PM)  Care Management Interventions: Reviewed instructions with patient (5/27/2025  1:26 PM)  What is the patient's perception of their health status since discharge?: Improving (5/27/2025  1:26 PM)  Is the patient/caregiver able to teach back the hierarchy of who to call/visit for symptoms/problems? PCP, Specialist, Home Health nurse, Urgent Care, ED, 911: Yes (5/27/2025  1:26 PM)  Patient/Caregiver Education Comments: Instructed on hospital discharge instructions. Instructed on new and changed medications. Instructed if increased shortness of breath or  chest pains to call 911. Provided my contact information and encouraged to call with any questions (5/27/2025  1:26 PM)

## 2025-06-05 ENCOUNTER — APPOINTMENT (OUTPATIENT)
Dept: PRIMARY CARE | Facility: CLINIC | Age: 70
End: 2025-06-05
Payer: MEDICARE

## 2025-06-05 VITALS
BODY MASS INDEX: 30.64 KG/M2 | OXYGEN SATURATION: 96 % | TEMPERATURE: 97 F | HEIGHT: 70 IN | SYSTOLIC BLOOD PRESSURE: 125 MMHG | DIASTOLIC BLOOD PRESSURE: 74 MMHG | HEART RATE: 50 BPM | WEIGHT: 214 LBS

## 2025-06-05 DIAGNOSIS — I63.013: ICD-10-CM

## 2025-06-05 DIAGNOSIS — R52 PAIN: ICD-10-CM

## 2025-06-05 DIAGNOSIS — E11.9 DIABETES MELLITUS WITH COINCIDENT HYPERTENSION (MULTI): ICD-10-CM

## 2025-06-05 DIAGNOSIS — S92.302A CLOSED AVULSION FRACTURE OF METATARSAL BONE OF LEFT FOOT, INITIAL ENCOUNTER: ICD-10-CM

## 2025-06-05 DIAGNOSIS — E78.2 DM TYPE 2 WITH DIABETIC MIXED HYPERLIPIDEMIA (MULTI): ICD-10-CM

## 2025-06-05 DIAGNOSIS — I10 DIABETES MELLITUS WITH COINCIDENT HYPERTENSION (MULTI): ICD-10-CM

## 2025-06-05 DIAGNOSIS — E11.49 DIABETES MELLITUS TYPE 2 WITH NEUROLOGICAL MANIFESTATIONS (MULTI): ICD-10-CM

## 2025-06-05 DIAGNOSIS — S32.302A: ICD-10-CM

## 2025-06-05 DIAGNOSIS — I48.20 CHRONIC ATRIAL FIBRILLATION (MULTI): ICD-10-CM

## 2025-06-05 DIAGNOSIS — E11.69 DM TYPE 2 WITH DIABETIC MIXED HYPERLIPIDEMIA (MULTI): ICD-10-CM

## 2025-06-05 DIAGNOSIS — Z09 HOSPITAL DISCHARGE FOLLOW-UP: Primary | ICD-10-CM

## 2025-06-05 DIAGNOSIS — Z12.31 ENCOUNTER FOR SCREENING MAMMOGRAM FOR MALIGNANT NEOPLASM OF BREAST: ICD-10-CM

## 2025-06-05 DIAGNOSIS — I69.354 HEMIPLEGIA AND HEMIPARESIS FOLLOWING CEREBRAL INFARCTION AFFECTING LEFT NON-DOMINANT SIDE (MULTI): ICD-10-CM

## 2025-06-05 DIAGNOSIS — A04.72 C. DIFFICILE COLITIS: ICD-10-CM

## 2025-06-05 PROBLEM — M77.11 EPICONDYLITIS, LATERAL, RIGHT: Status: RESOLVED | Noted: 2024-11-11 | Resolved: 2025-06-05

## 2025-06-05 PROCEDURE — 1036F TOBACCO NON-USER: CPT | Performed by: INTERNAL MEDICINE

## 2025-06-05 PROCEDURE — 3078F DIAST BP <80 MM HG: CPT | Performed by: INTERNAL MEDICINE

## 2025-06-05 PROCEDURE — 3008F BODY MASS INDEX DOCD: CPT | Performed by: INTERNAL MEDICINE

## 2025-06-05 PROCEDURE — 1159F MED LIST DOCD IN RCRD: CPT | Performed by: INTERNAL MEDICINE

## 2025-06-05 PROCEDURE — 4010F ACE/ARB THERAPY RXD/TAKEN: CPT | Performed by: INTERNAL MEDICINE

## 2025-06-05 PROCEDURE — 99496 TRANSJ CARE MGMT HIGH F2F 7D: CPT | Performed by: INTERNAL MEDICINE

## 2025-06-05 PROCEDURE — 1160F RVW MEDS BY RX/DR IN RCRD: CPT | Performed by: INTERNAL MEDICINE

## 2025-06-05 PROCEDURE — 3074F SYST BP LT 130 MM HG: CPT | Performed by: INTERNAL MEDICINE

## 2025-06-05 RX ORDER — IRON POLYSACCHARIDE COMPLEX 150 MG
150 CAPSULE ORAL DAILY
COMMUNITY

## 2025-06-05 RX ORDER — METOPROLOL TARTRATE 100 MG/1
1 TABLET ORAL
COMMUNITY
Start: 2025-05-23

## 2025-06-05 RX ORDER — ASCORBIC ACID 500 MG
500 TABLET ORAL DAILY
COMMUNITY

## 2025-06-05 ASSESSMENT — ENCOUNTER SYMPTOMS
DEPRESSION: 0
OCCASIONAL FEELINGS OF UNSTEADINESS: 1
LOSS OF SENSATION IN FEET: 0

## 2025-06-05 ASSESSMENT — PATIENT HEALTH QUESTIONNAIRE - PHQ9
2. FEELING DOWN, DEPRESSED OR HOPELESS: NOT AT ALL
1. LITTLE INTEREST OR PLEASURE IN DOING THINGS: NOT AT ALL
SUM OF ALL RESPONSES TO PHQ9 QUESTIONS 1 AND 2: 0

## 2025-06-05 NOTE — ASSESSMENT & PLAN NOTE
History of fall with multiple fractures of the pelvis left side and the left foot refer to PT OT orthopedics take vitamin C vitamin D calcium supplement nonsurgical management

## 2025-06-05 NOTE — PROGRESS NOTES
"Subjective   Patient ID: Viki Boykin \"Deon" is a 69 y.o. female who presents for Hospital Follow-up (Discuss C-diff finished meds on Monday/) and Leg Pain (Left leg ).    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)    Relevant Orders    Referral to Ophthalmology    Comprehensive Metabolic Panel    DM type 2 with diabetic mixed hyperlipidemia (Multi)    Pain    Voltaren gel vitamin C vitamin D calcium acetaminophen no narcotics         Relevant Orders    XR pelvis 1-2 views    XR foot left 1-2 views    Hospital discharge follow-up - Primary    Hospital patient hospitalized since last visit medication list reviewed and  reconciled with discharge medications face to face visit with patient discuss discharge medication and outpatient medication ceconciliations and answers all questions to patient's and caregiver review hospital record pending diagnostic test and treatment orders to improved coordinate care across the medical community and  referal with provider/service to support patient's health and health related problems to increase patient's satisfaction by reducing risk of readmission I improving And meeting patient's needs advise bring all prescription and nonprescription medication with you.  Case discussed with orthopedics ID and is cardiologist         C. difficile colitis    Cholestyramine vancomycin advise repeat C. difficile test         Relevant Orders    CBC and Auto Differential    Magnesium    Uric Acid    C. difficile, PCR    Closed avulsion fracture of metatarsal bone " of left foot    History of fall with multiple fractures of the pelvis left side and the left foot refer to PT OT orthopedics take vitamin C vitamin D calcium supplement nonsurgical management         Closed displaced fracture of left ilium       HPIPCSDT BETTYENBR [id number] Date(s): 5/20/25 - 5/23/25  [facility] [address] [place] [address]  [phone]  Encounter Diagnosis  Acute kidney injury (Discharge Diagnosis) - 5/20/25  Acute hypokalemia (Discharge Diagnosis) - 5/20/25  C. difficile colitis (Discharge Diagnosis) - 5/20/25  Discharge Disposition: -[facility]  Attending Physician: ABIGAIL BARTHOLOMEW MD  Admitting Physician: LUIZA LOZANO MD  Encounter Type: Inpatient  This is a 69-year-old patient who had a history of the fall with a fracture of the left foot and pelvic was in a hospital discharged to nursing home on antibiotic developed nausea vomiting diarrhea confusion diagnosis of C. difficile colitis versus acute kidney injury acute hypokalemia dehydration treated with IV fluid normal saline potassium magnesium supplement and vancomycin    Patient discharged on vancomycin and cholestyramine patient diarrhea finally stopped and feeling better    Continue have abdominal discomfort associated with the pelvic pain and left leg pain    Going to be evaluated by the orthopedic physical therapy and GI    Negative for hematuria rectal bleeding or diarrhea    Negative for DVT or PE    Review lab and medications patient blood sugar running between 120-130    Case discussed with infectious disease and orthopedics    Acute kidney injury resolved acute hypokalemia-resolved C. difficile colitis under control repeat CBC BMP C. difficile KUB    Continue to have moderate pain discomfort in the pelvic and the left foot sent for the x-ray of the pelvic and left foot refer patient to orthopedics.  Medical History[1]  Surgical History[2]  Allergies[3]  Current  "Medications[4]  Family History[5]  Social History[6]  Immunization History   Administered Date(s) Administered    Flu vaccine (IIV4), preservative free *Check age/dose* 01/09/2018, 09/24/2021    Flu vaccine, quadrivalent, high-dose, preservative free, age 65y+ (FLUZONE) 10/12/2023    Flu vaccine, trivalent, preservative free, HIGH-DOSE, age 65y+ (Fluzone) 11/11/2024    Flu vaccine, trivalent, preservative free, age 6 months and greater (Fluarix/Fluzone/Flulaval) 12/01/2015, 09/12/2016    Influenza, Unspecified 11/16/2009, 10/12/2010, 11/12/2012, 08/29/2013, 09/22/2014, 12/01/2015, 09/12/2016, 09/07/2018, 09/09/2019, 10/13/2020, 10/18/2022    Influenza, seasonal, injectable 11/16/2009, 10/12/2010, 11/12/2012, 08/29/2013, 09/22/2014, 12/01/2015, 09/12/2016, 09/07/2018, 10/13/2020    Novel influenza-H1N1-09, preservative-free 01/13/2010    Pfizer Gray Cap SARS-CoV-2 04/30/2022    Pfizer Purple Cap SARS-CoV-2 03/30/2021, 04/20/2021    Pneumococcal conjugate vaccine, 20-valent (PREVNAR 20) 02/13/2023    Pneumococcal polysaccharide vaccine, 23-valent, age 2 years and older (PNEUMOVAX 23) 06/19/2020    RSV, 60 Years And Older (AREXVY) 01/03/2024    SARS-CoV-2, Unspecified 04/30/2022    Td vaccine, age 7 years and older (TDVAX) 11/20/2007    Tdap vaccine, age 7 year and older (BOOSTRIX, ADACEL) 05/23/2021    Tetanus toxoid, adsorbed 11/20/2007    Zoster vaccine, recombinant, adult (SHINGRIX) 01/18/2024, 04/19/2024       Review of Systems  Review of systems is otherwise negative unless stated above or in history of present illness.    Objective   Visit Vitals  /74 (BP Location: Right arm, Patient Position: Sitting)   Pulse 50   Temp 36.1 °C (97 °F)   Ht 1.778 m (5' 10\")   Wt 97.1 kg (214 lb)   SpO2 96%   BMI 30.71 kg/m²   Smoking Status Never   BSA 2.19 m²     Physical Exam  Constitutional: BMI 30     General: not in acute distress.   HENT: Runny nose     Head: Normocephalic and atraumatic.      Nose: Nose normal. "   Eyes: No jaundice     Extraocular Movements: Extraocular movements intact.      Conjunctiva/sclera: Conjunctivae normal.   Cardiovascular: Irregular     Rate and Rhythm: Normal rate ,  No M/R/G  Pulmonary: Crackle     Effort: Pulmonary effort is normal.      Breath sounds: Normal, Bilat Equal AE  Skin: Dry skin     General: Skin is warm.   Neurological: Neuropathy bilaterally     Mental Status: He is alert and oriented to person, place, and time.   Psychiatric:    Anxiety     Mood and Affect: Mood normal.         Behavior: Behavior normal.   Musculoskeletal postfracture tenderness left foot left pelvic left knee  FROM in all extremitirs,  Joint-no swelling or tenderness    No visits with results within 4 Month(s) from this visit.   Latest known visit with results is:   Orders Only on 11/18/2024   Component Date Value Ref Range Status    [facility] WBC 11/18/2024 9.4  4.5 - 11.0 x10 Final    [facility] MPV 11/18/2024 7.7  7.4 - 10.4 fL Final    [facility] Instr WBC 11/18/2024 9.4   Final    [facility] HCT 11/18/2024 43.6  36.0 - 46.0 % Final    [facility] Platelet 11/18/2024 298  150 - 450 x10 Final    [facility] RBC 11/18/2024 5.25  4.20 - 5.40 x10 Final    [facility] MCHC 11/18/2024 33.1  32.0 - 37.0 g/dL Final    [facility] Nucleated RBC 11/18/2024 0  /100WBC Final    [facility] MCH 11/18/2024 27.5  27.0 - 34.0 pg Final    [facility] MCV 11/18/2024 83.2  80.0 - 100.0 fL Final    [facility] HGB 11/18/2024 14.4  12.0 - 16.0 g/dL Final    [facility] RDW 11/18/2024 14.5  11.5 - 14.5 % Final    [facility] DIFF? 11/18/2024 No   Final    [facility] Eos Count 11/18/2024 0.10  0.00 - 0.50 x1000 Final    [facility] Mono Count 11/18/2024 0.69  0.10 - 1.00 x1000 Final    [facility] Mono % 11/18/2024 7.3  % Final    [facility] Lymph % 11/18/2024 17.5  % Final    [facility] Baso Count 11/18/2024 0.06  0.00 - 0.20 x1000 Final    [facility] Neutrophil Count (ANC) 11/18/2024 6.90  1.40 - 8.80 x1000 Final    [facility] Eosin %  11/18/2024 1.1  % Final    [facility] Neutrophil % 11/18/2024 73.5  % Final    [facility] Lymph Count 11/18/2024 1.64  1.20 - 4.80 x1000 Final    [facility] Basos % 11/18/2024 0.7  % Final    [facility] Nitrite, U 11/18/2024 Negative  Negative Final    [facility] Bilirubin, U 11/18/2024 Negative  Negative Final    [facility] Appearance, U 11/18/2024 Clear  Clear Final    [facility] Protein, U 11/18/2024 Negative  Negative Final    [facility] Blood, U 11/18/2024 Negative  Negative Final    [facility] Leukocyte Esterase, U 11/18/2024 Negative  Negative Final    [facility] Ketones, U 11/18/2024 Negative  Negative Final    [facility] Glucose Qual, U 11/18/2024 Negative  Negative Final    [facility] Urobilinogen Qual, U 11/18/2024 < 2 mg/dl  < 2 mg/dl Final    [facility] Color, U 11/18/2024 Colorless  Yellow Final    [facility] pH, U 11/18/2024 6.0  4.5 - 8.0 Final    [facility] Specific Gravity, U 11/18/2024 1.009  1.001 - 1.035 Final    [facility] U MICRO 11/18/2024 Not Indicated   Final    [facility] Microalbumin/Creatinine* 11/18/2024 81 (H)  0 - 30 mg MALB/gm CREAT Final    [facility] Creatinine, Urine mg/dl 11/18/2024 26.0  mg/dL Final    [facility] Microalbumin, Urine mg/L 11/18/2024 21.0  mg/L Final    [facility] Magnesium 11/18/2024 1.9  1.6 - 2.6 mg/dL Final    [facility] Uric Acid 11/18/2024 4.0  3.1 - 7.8 mg/dL Final    [facility] BUN 11/18/2024 16  9 - 23 mg/dL Final    [facility] Calcium 11/18/2024 10.3  8.7 - 10.4 mg/dL Final    [facility] Glucose 11/18/2024 77  74 - 106 mg/dL Final    [facility] Glomerular Filtration R* 11/18/2024 >60  mL/min/1.73m? Final    [facility] Chloride 11/18/2024 103  98 - 107 mmol/L Final    [facility] Na 11/18/2024 141  135 - 145 mmol/L Final    [facility] BUN/Creat Ratio 11/18/2024 20.0   Final    [facility] Creatinine 11/18/2024 0.8  0.5 - 0.8 mg/dL Final    [facility] GFR AA 11/18/2024 >60   Final    [facility] CO2, venous 11/18/2024 30.0  20.0 - 31.0 mmol/L Final    [facility] Calculated Osmolality 11/18/2024 281  275 - 295 mOsm/kg Final    [facility] K 11/18/2024 4.2  3.5 - 5.1 mmol/L Final    [facility] HDL Cholesterol 11/18/2024 46  40 - 60 mg/dL Final    [facility] Total Chol/HDL Chol Rat* 11/18/2024 3.4   Final    [facility] Triglycerides 11/18/2024 115  30 - 150 mg/dL Final    [facility] Cholesterol 11/18/2024 155  100 - 200 mg/dL Final    [facility] Calculated LDL Choleste* 11/18/2024 86  60 - 130 mg/dL Final    [facility] HGB A1C 11/18/2024 7.3  % Final       Radiology: Reviewed imaging in powerchart.  Imaging  No results found.    Cardiology, Vascular, and Other Imaging  No other imaging results found for the past 7 days        Charting was completed using voice recognition technology and may include unintended errors.            [1]   Past Medical History:  Diagnosis Date    Abscess of vulva 06/22/2020    Boil, labium    Body mass index (BMI) 31.0-31.9, adult 06/30/2022    BMI 31.0-31.9,adult    Candidiasis of skin and nail 12/05/2016    Skin yeast infection    Candidiasis of skin and nail     Cutaneous candidiasis    Candidiasis, unspecified 06/19/2020    Yeast infection    Contact with and (suspected) exposure to covid-19 02/08/2021    Suspected COVID-19 virus infection    Encounter for screening for malignant neoplasm of colon 06/27/2022    Screen for colon cancer    Encounter for screening for malignant neoplasm of rectum 06/27/2022    Screening for rectal cancer    Essential (primary) hypertension 07/17/2020    Benign essential hypertension    Fracture of orbit, unspecified, initial encounter for closed fracture (Multi) 05/25/2021    Right orbital fracture    History of falling 12/02/2021    History of fall    Influenza due to unidentified influenza virus with other respiratory manifestations 03/05/2018    Bronchitis with flu    Osteoarthritis of knee, unspecified     Knee osteoarthritis    Overweight 06/30/2022    Overweight with body mass index (BMI) of 28 to 28.9 in  adult    Pain in left shoulder 06/19/2020    Left shoulder pain    Personal history of other diseases of the circulatory system 12/02/2021    History of peripheral arterial disease    Personal history of other diseases of the digestive system 05/15/2019    History of constipation    Personal history of other diseases of the female genital tract 10/19/2017    History of vaginitis    Personal history of other diseases of the musculoskeletal system and connective tissue     Personal history of osteoporosis    Personal history of other diseases of the musculoskeletal system and connective tissue 11/11/2019    History of muscle spasm    Personal history of other endocrine, nutritional and metabolic disease 11/04/2022    History of vitamin D deficiency    Personal history of other endocrine, nutritional and metabolic disease 12/02/2021    History of hypothyroidism    Personal history of other endocrine, nutritional and metabolic disease 06/19/2020    History of morbid obesity    Personal history of other endocrine, nutritional and metabolic disease 06/19/2020    History of hyperlipidemia    Personal history of other endocrine, nutritional and metabolic disease 02/18/2020    History of hypokalemia    Personal history of other infectious and parasitic diseases     History of mumps    Personal history of other infectious and parasitic diseases     History of measles    Personal history of other infectious and parasitic diseases     History of varicella    Personal history of pneumonia (recurrent)     History of pneumonia    Personal history of traumatic brain injury 12/02/2021    History of closed head injury    Personal history of urinary (tract) infections 02/09/2021    History of urinary tract infection    Type 2 diabetes mellitus with unspecified complications 02/01/2017    Type 2 diabetes mellitus with complication, unspecified long term insulin use status    Unspecified dislocation of left little finger, initial  "encounter     Dislocation of fifth finger, left, closed   [2]   Past Surgical History:  Procedure Laterality Date     SECTION, CLASSIC  2014     Section    SHOULDER SURGERY  2014    Shoulder Surgery    TONSILLECTOMY  2014    Tonsillectomy With Adenoidectomy   [3]   Allergies  Allergen Reactions    Cat Dander Swelling    Penicillins Other     PCN, did not work on patient.    Tree And Shrub Pollen Hives   [4]   Current Outpatient Medications   Medication Sig Dispense Refill    metoprolol tartrate (Lopressor) 100 mg tablet Take 1 tablet (100 mg) by mouth every 12 hours.      amLODIPine (Norvasc) 5 mg tablet Take 1 tablet (5 mg) by mouth 2 times a day. 180 tablet 3    apixaban (Eliquis) 5 mg tablet Take 1 tablet (5 mg) by mouth 2 times a day. 180 tablet 3    calcium carbonate-vitamin D3 600 mg-5 mcg (200 unit) tablet Take by mouth once daily.      flecainide (Tambocor) 100 mg tablet Take 1 tablet (100 mg) by mouth every 12 hours. 180 tablet 3    furosemide (Lasix) 20 mg tablet Take 1 tablet (20 mg) by mouth once daily. 90 tablet 3    insulin degludec (Tresiba FlexTouch U-200) 200 unit/mL (3 mL) injection INJECT SUBCUTANEOUSLY 58 UNITS  ONCE DAILY 27 mL 3    lisinopril 20 mg tablet Take 1 tablet (20 mg) by mouth once daily. 90 tablet 3    metFORMIN (Glucophage) 500 mg tablet Take 1 tablet (500 mg) by mouth 3 times a day. 270 tablet 3    Ozempic 0.25 mg or 0.5 mg (2 mg/3 mL) pen injector INJECT 0.25 MG SUBCUTANEOUSLY  WEEKLY FOR 4 WEEKS, THEN 0.5 MG  WEEKLY THEREAFTER 6 mL 4    pen needle, diabetic (BD Ultra-Fine Dulce Pen Needle) 32 gauge x 5/32\" needle INJECTS ONCE A  each 3    potassium chloride CR 20 mEq ER tablet Take 1 tablet (20 mEq) by mouth once daily. 90 tablet 3    rosuvastatin (Crestor) 10 mg tablet Take 1 tablet (10 mg) by mouth once daily. 90 tablet 3    tiZANidine (Zanaflex) 4 mg tablet Take 1 tablet (4 mg) by mouth every 12 hours if needed for muscle spasms. 10 " tablet 0    zinc acetate 50 mg (zinc) capsule 50 mg.       No current facility-administered medications for this visit.   [5]   Family History  Problem Relation Name Age of Onset    Hypertension Mother      Diabetes Mother      Other (fibroid tumors) Mother      Diabetes Father      Heart disease Father     [6]   Social History  Socioeconomic History    Marital status:    Tobacco Use    Smoking status: Never    Smokeless tobacco: Never   Substance and Sexual Activity    Alcohol use: Yes     Comment: rare    Drug use: Never

## 2025-06-05 NOTE — ASSESSMENT & PLAN NOTE
Hospital patient hospitalized since last visit medication list reviewed and  reconciled with discharge medications face to face visit with patient discuss discharge medication and outpatient medication ceconciliations and answers all questions to patient's and caregiver review hospital record pending diagnostic test and treatment orders to improved coordinate care across the medical community and  referal with provider/service to support patient's health and health related problems to increase patient's satisfaction by reducing risk of readmission I improving And meeting patient's needs advise bring all prescription and nonprescription medication with you.  Case discussed with orthopedics ID and is cardiologist

## 2025-06-09 LAB — C DIFF TOX GENS STL QL NAA+PROBE: NOT DETECTED

## 2025-06-10 ENCOUNTER — PATIENT OUTREACH (OUTPATIENT)
Dept: PRIMARY CARE | Facility: CLINIC | Age: 70
End: 2025-06-10
Payer: MEDICARE

## 2025-06-10 NOTE — PROGRESS NOTES
Confirmation of at least 2 patient identifiers.    Completed telephonic follow-up with patient after recent visit with PCP  Spoke to patient during outreach call.    Patient reports feeling: Improved  Continues with pelvic fracture and fractured toe but stool tested negative for C-dif at her follow up appt.     Patient has questions or concerns about medications: Yes - She is calling cardiology for refill for metoprolol. She feels it is too strong and she is tired all the time. She will call for refill and inquire about a lower dose.    Have all prescribed medications been filled? Yes    Patient has necessary resources to manage their care? Yes    Patient has questions or concerns? No    She has follow up scheduled with ortho.   Next care management follow-up approximately within one month.  Care  information provided to patient.

## 2025-06-13 ENCOUNTER — TELEMEDICINE (OUTPATIENT)
Dept: PRIMARY CARE | Facility: CLINIC | Age: 70
End: 2025-06-13
Payer: MEDICARE

## 2025-06-13 DIAGNOSIS — E11.49 DIABETES MELLITUS TYPE 2 WITH NEUROLOGICAL MANIFESTATIONS (MULTI): ICD-10-CM

## 2025-06-13 DIAGNOSIS — A04.72 C. DIFFICILE COLITIS: ICD-10-CM

## 2025-06-13 DIAGNOSIS — S32.302A: ICD-10-CM

## 2025-06-13 DIAGNOSIS — A09 COLITIS, INFECTIOUS: Primary | ICD-10-CM

## 2025-06-13 DIAGNOSIS — K58.0 IRRITABLE BOWEL SYNDROME WITH DIARRHEA: ICD-10-CM

## 2025-06-13 DIAGNOSIS — E11.69 DM TYPE 2 WITH DIABETIC MIXED HYPERLIPIDEMIA (MULTI): ICD-10-CM

## 2025-06-13 DIAGNOSIS — I48.20 CHRONIC ATRIAL FIBRILLATION (MULTI): ICD-10-CM

## 2025-06-13 DIAGNOSIS — I69.354 HEMIPLEGIA AND HEMIPARESIS FOLLOWING CEREBRAL INFARCTION AFFECTING LEFT NON-DOMINANT SIDE (MULTI): ICD-10-CM

## 2025-06-13 DIAGNOSIS — I10 DIABETES MELLITUS WITH COINCIDENT HYPERTENSION (MULTI): ICD-10-CM

## 2025-06-13 DIAGNOSIS — E78.2 DM TYPE 2 WITH DIABETIC MIXED HYPERLIPIDEMIA (MULTI): ICD-10-CM

## 2025-06-13 DIAGNOSIS — E11.9 DIABETES MELLITUS WITH COINCIDENT HYPERTENSION (MULTI): ICD-10-CM

## 2025-06-13 PROBLEM — S92.302A CLOSED AVULSION FRACTURE OF METATARSAL BONE OF LEFT FOOT: Status: RESOLVED | Noted: 2025-06-05 | Resolved: 2025-06-13

## 2025-06-13 PROBLEM — Z12.31 ENCOUNTER FOR SCREENING MAMMOGRAM FOR MALIGNANT NEOPLASM OF BREAST: Status: RESOLVED | Noted: 2023-06-15 | Resolved: 2025-06-13

## 2025-06-13 PROBLEM — N39.0 UTI (URINARY TRACT INFECTION), UNCOMPLICATED: Status: RESOLVED | Noted: 2024-10-24 | Resolved: 2025-06-13

## 2025-06-13 PROBLEM — I63.9 CEREBROVASCULAR ACCIDENT (CVA) (MULTI): Status: RESOLVED | Noted: 2023-06-15 | Resolved: 2025-06-13

## 2025-06-13 PROBLEM — R52 PAIN: Status: RESOLVED | Noted: 2024-11-11 | Resolved: 2025-06-13

## 2025-06-13 PROBLEM — Z09 HOSPITAL DISCHARGE FOLLOW-UP: Status: RESOLVED | Noted: 2025-06-05 | Resolved: 2025-06-13

## 2025-06-13 PROBLEM — N02.0 IDIOPATHIC HEMATURIA WITH MINOR GLOMERULAR ABNORMALITY: Status: RESOLVED | Noted: 2024-10-24 | Resolved: 2025-06-13

## 2025-06-13 PROCEDURE — 99213 OFFICE O/P EST LOW 20 MIN: CPT | Performed by: INTERNAL MEDICINE

## 2025-06-13 PROCEDURE — 4010F ACE/ARB THERAPY RXD/TAKEN: CPT | Performed by: INTERNAL MEDICINE

## 2025-06-13 PROCEDURE — 1160F RVW MEDS BY RX/DR IN RCRD: CPT | Performed by: INTERNAL MEDICINE

## 2025-06-13 PROCEDURE — 1036F TOBACCO NON-USER: CPT | Performed by: INTERNAL MEDICINE

## 2025-06-13 PROCEDURE — 1159F MED LIST DOCD IN RCRD: CPT | Performed by: INTERNAL MEDICINE

## 2025-06-13 PROCEDURE — G2211 COMPLEX E/M VISIT ADD ON: HCPCS | Performed by: INTERNAL MEDICINE

## 2025-06-13 NOTE — ASSESSMENT & PLAN NOTE
Diabetes with complication affecting heart kidney advise continue current medication hold Glucophage because of the diarrhea patient will continue Ozempic and Tresiba amlodipine plus Crestor lisinopril and Lopressor monitor CMP uric acid magnesium and follow-up

## 2025-06-13 NOTE — ASSESSMENT & PLAN NOTE
C. difficile colitis gastroenteritis diarrhea advised stool for CNS pathogen C. difficile CBC CMP uric acid magnesium advised no dairy products no Glucophage will decide about bacterial or antibiotic like vancomycin Flagyl or Cipro depend upon the report problem is continue does not get any better contact infectious disease Dr. Faria

## 2025-06-16 ENCOUNTER — TELEPHONE (OUTPATIENT)
Dept: PRIMARY CARE | Facility: CLINIC | Age: 70
End: 2025-06-16
Payer: MEDICARE

## 2025-06-16 DIAGNOSIS — A04.72 C. DIFFICILE COLITIS: ICD-10-CM

## 2025-06-16 RX ORDER — VANCOMYCIN HYDROCHLORIDE 250 MG/1
250 CAPSULE ORAL 3 TIMES DAILY
Qty: 30 CAPSULE | Refills: 0 | Status: SHIPPED | OUTPATIENT
Start: 2025-06-16 | End: 2025-06-26

## 2025-06-16 NOTE — TELEPHONE ENCOUNTER
"Patient submitted her stool samples on Friday and one of the results said \"patient should return\" Please advise.  "

## 2025-06-16 NOTE — TELEPHONE ENCOUNTER
----- Message from Tosha Lamb sent at 6/16/2025  3:18 PM EDT -----  Vancomycin to 50 mg 3 times a day for 10 days follow-up with Dr. Segovia to ID  ----- Message -----  From: [employer] Results In  Sent: 6/16/2025   1:10 PM EDT  To: Tosha Lamb MD

## 2025-06-17 ENCOUNTER — TELEPHONE (OUTPATIENT)
Dept: PRIMARY CARE | Facility: CLINIC | Age: 70
End: 2025-06-17
Payer: MEDICARE

## 2025-06-17 DIAGNOSIS — I63.013: ICD-10-CM

## 2025-06-17 LAB
C COLI+JEJUNI+LARI FUSA STL QL NAA+PROBE: NOT DETECTED
C DIFF TOX GENS STL QL NAA+PROBE: DETECTED
EC STX1 GENE STL QL NAA+PROBE: NOT DETECTED
EC STX2 GENE STL QL NAA+PROBE: NOT DETECTED
NOROV GI+II ORF1-ORF2 JNC STL QL NAA+PR: NOT DETECTED
RVA NSP5 STL QL NAA+PROBE: NOT DETECTED
SALMONELLA SP RPOD STL QL NAA+PROBE: NOT DETECTED
SHIGELLA DNA SPEC QL NAA+PROBE: NOT DETECTED
V CHOL+PARA RFBL+TRKH+TNAA STL QL NAA+PR: NOT DETECTED
Y ENTERO RECN STL QL NAA+PROBE: NOT DETECTED

## 2025-06-17 RX ORDER — METOPROLOL TARTRATE 100 MG/1
100 TABLET ORAL
Qty: 180 TABLET | Refills: 3 | Status: SHIPPED | OUTPATIENT
Start: 2025-06-17

## 2025-06-17 NOTE — TELEPHONE ENCOUNTER
----- Message from Tosha Lamb sent at 6/17/2025 11:26 AM EDT -----  Continue vancomycin and follow-up with Dr. SIDDIQUI DM2 infectious disease  ----- Message -----  From: [employer] Results In  Sent: 6/16/2025   1:10 PM EDT  To: Tosha Lamb MD

## 2025-06-26 ENCOUNTER — TELEPHONE (OUTPATIENT)
Dept: PRIMARY CARE | Facility: CLINIC | Age: 70
End: 2025-06-26
Payer: MEDICARE

## 2025-06-26 NOTE — TELEPHONE ENCOUNTER
Advised and she said she will do tid and she will call us and let us know if she is not doing better and then numbers aren't getting better

## 2025-06-26 NOTE — TELEPHONE ENCOUNTER
PT SEEN YESTERDAY. HR IS TRENDING LOW    DOSAGE CHANGED IN HOSP. (METOPROLOL)    NOW BRADYCARDIA (50-60)  SYMPTOMATIC  WEAK   FATIGUE    PLEASE ADVISE

## 2025-07-02 DIAGNOSIS — I10 HYPERTENSION, UNSPECIFIED TYPE: ICD-10-CM

## 2025-07-03 RX ORDER — LISINOPRIL 20 MG/1
20 TABLET ORAL DAILY
Qty: 90 TABLET | Refills: 3 | Status: SHIPPED | OUTPATIENT
Start: 2025-07-03

## 2025-08-13 ENCOUNTER — PATIENT OUTREACH (OUTPATIENT)
Dept: PRIMARY CARE | Facility: CLINIC | Age: 70
End: 2025-08-13
Payer: MEDICARE

## 2025-08-14 ENCOUNTER — APPOINTMENT (OUTPATIENT)
Dept: PRIMARY CARE | Facility: CLINIC | Age: 70
End: 2025-08-14
Payer: MEDICARE

## 2025-08-14 VITALS
SYSTOLIC BLOOD PRESSURE: 126 MMHG | OXYGEN SATURATION: 96 % | HEART RATE: 55 BPM | DIASTOLIC BLOOD PRESSURE: 82 MMHG | TEMPERATURE: 97.2 F | HEIGHT: 70 IN | BODY MASS INDEX: 32.21 KG/M2 | WEIGHT: 225 LBS

## 2025-08-14 DIAGNOSIS — I69.354 HEMIPLEGIA AND HEMIPARESIS FOLLOWING CEREBRAL INFARCTION AFFECTING LEFT NON-DOMINANT SIDE (MULTI): ICD-10-CM

## 2025-08-14 DIAGNOSIS — E78.2 DM TYPE 2 WITH DIABETIC MIXED HYPERLIPIDEMIA (MULTI): ICD-10-CM

## 2025-08-14 DIAGNOSIS — E11.69 DM TYPE 2 WITH DIABETIC MIXED HYPERLIPIDEMIA (MULTI): ICD-10-CM

## 2025-08-14 DIAGNOSIS — E11.42 DIABETIC POLYNEUROPATHY ASSOCIATED WITH TYPE 2 DIABETES MELLITUS: ICD-10-CM

## 2025-08-14 DIAGNOSIS — I48.20 CHRONIC ATRIAL FIBRILLATION (MULTI): ICD-10-CM

## 2025-08-14 DIAGNOSIS — R42 VERTIGO: Primary | ICD-10-CM

## 2025-08-14 PROBLEM — S32.302A: Status: RESOLVED | Noted: 2025-06-05 | Resolved: 2025-08-14

## 2025-08-14 PROBLEM — Z23 NEED FOR INFLUENZA VACCINATION: Status: RESOLVED | Noted: 2023-10-12 | Resolved: 2025-08-14

## 2025-08-14 PROBLEM — A04.72 C. DIFFICILE COLITIS: Status: RESOLVED | Noted: 2025-06-05 | Resolved: 2025-08-14

## 2025-08-14 PROBLEM — A09 COLITIS, INFECTIOUS: Status: RESOLVED | Noted: 2025-06-13 | Resolved: 2025-08-14

## 2025-08-14 PROCEDURE — 3008F BODY MASS INDEX DOCD: CPT | Performed by: INTERNAL MEDICINE

## 2025-08-14 PROCEDURE — 3074F SYST BP LT 130 MM HG: CPT | Performed by: INTERNAL MEDICINE

## 2025-08-14 PROCEDURE — 99214 OFFICE O/P EST MOD 30 MIN: CPT | Performed by: INTERNAL MEDICINE

## 2025-08-14 PROCEDURE — 1160F RVW MEDS BY RX/DR IN RCRD: CPT | Performed by: INTERNAL MEDICINE

## 2025-08-14 PROCEDURE — G2211 COMPLEX E/M VISIT ADD ON: HCPCS | Performed by: INTERNAL MEDICINE

## 2025-08-14 PROCEDURE — 4010F ACE/ARB THERAPY RXD/TAKEN: CPT | Performed by: INTERNAL MEDICINE

## 2025-08-14 PROCEDURE — 1159F MED LIST DOCD IN RCRD: CPT | Performed by: INTERNAL MEDICINE

## 2025-08-14 PROCEDURE — 3079F DIAST BP 80-89 MM HG: CPT | Performed by: INTERNAL MEDICINE

## 2025-08-14 PROCEDURE — 1036F TOBACCO NON-USER: CPT | Performed by: INTERNAL MEDICINE

## 2025-08-14 ASSESSMENT — ENCOUNTER SYMPTOMS
DEPRESSION: 0
LOSS OF SENSATION IN FEET: 1
OCCASIONAL FEELINGS OF UNSTEADINESS: 0

## 2025-09-02 DIAGNOSIS — I63.013: ICD-10-CM

## 2025-09-02 RX ORDER — METOPROLOL TARTRATE 25 MG/1
25 TABLET, FILM COATED ORAL 2 TIMES DAILY
Qty: 180 TABLET | Refills: 3 | Status: SHIPPED | OUTPATIENT
Start: 2025-09-02

## 2025-09-22 ENCOUNTER — APPOINTMENT (OUTPATIENT)
Dept: PRIMARY CARE | Facility: CLINIC | Age: 70
End: 2025-09-22
Payer: MEDICARE